# Patient Record
Sex: FEMALE | Race: WHITE | NOT HISPANIC OR LATINO
[De-identification: names, ages, dates, MRNs, and addresses within clinical notes are randomized per-mention and may not be internally consistent; named-entity substitution may affect disease eponyms.]

---

## 2017-01-17 ENCOUNTER — APPOINTMENT (OUTPATIENT)
Dept: BARIATRICS | Facility: CLINIC | Age: 47
End: 2017-01-17

## 2017-01-17 VITALS
HEIGHT: 69 IN | SYSTOLIC BLOOD PRESSURE: 105 MMHG | HEART RATE: 65 BPM | BODY MASS INDEX: 19.62 KG/M2 | WEIGHT: 132.5 LBS | DIASTOLIC BLOOD PRESSURE: 61 MMHG

## 2017-01-17 DIAGNOSIS — Z90.49 ACQUIRED ABSENCE OF OTHER SPECIFIED PARTS OF DIGESTIVE TRACT: ICD-10-CM

## 2017-02-07 ENCOUNTER — APPOINTMENT (OUTPATIENT)
Dept: GASTROENTEROLOGY | Facility: CLINIC | Age: 47
End: 2017-02-07

## 2017-02-07 VITALS
HEART RATE: 82 BPM | BODY MASS INDEX: 19.26 KG/M2 | HEIGHT: 69 IN | DIASTOLIC BLOOD PRESSURE: 69 MMHG | OXYGEN SATURATION: 99 % | WEIGHT: 130 LBS | SYSTOLIC BLOOD PRESSURE: 103 MMHG

## 2017-02-07 DIAGNOSIS — R14.0 ABDOMINAL DISTENSION (GASEOUS): ICD-10-CM

## 2017-03-26 ENCOUNTER — RESULT REVIEW (OUTPATIENT)
Age: 47
End: 2017-03-26

## 2017-04-03 ENCOUNTER — RESULT REVIEW (OUTPATIENT)
Age: 47
End: 2017-04-03

## 2017-12-07 ENCOUNTER — APPOINTMENT (OUTPATIENT)
Dept: RADIOLOGY | Facility: CLINIC | Age: 47
End: 2017-12-07

## 2017-12-07 ENCOUNTER — OUTPATIENT (OUTPATIENT)
Dept: OUTPATIENT SERVICES | Facility: HOSPITAL | Age: 47
LOS: 1 days | End: 2017-12-07
Payer: COMMERCIAL

## 2017-12-07 DIAGNOSIS — Z98.890 OTHER SPECIFIED POSTPROCEDURAL STATES: Chronic | ICD-10-CM

## 2017-12-07 DIAGNOSIS — Z00.8 ENCOUNTER FOR OTHER GENERAL EXAMINATION: ICD-10-CM

## 2017-12-07 PROCEDURE — 71046 X-RAY EXAM CHEST 2 VIEWS: CPT

## 2017-12-07 PROCEDURE — 71020: CPT | Mod: 26

## 2018-01-24 ENCOUNTER — TRANSCRIPTION ENCOUNTER (OUTPATIENT)
Age: 48
End: 2018-01-24

## 2018-03-03 ENCOUNTER — TRANSCRIPTION ENCOUNTER (OUTPATIENT)
Age: 48
End: 2018-03-03

## 2018-03-06 ENCOUNTER — APPOINTMENT (OUTPATIENT)
Dept: GASTROENTEROLOGY | Facility: CLINIC | Age: 48
End: 2018-03-06
Payer: COMMERCIAL

## 2018-03-06 VITALS
WEIGHT: 133 LBS | DIASTOLIC BLOOD PRESSURE: 73 MMHG | HEART RATE: 76 BPM | BODY MASS INDEX: 19.7 KG/M2 | OXYGEN SATURATION: 99 % | HEIGHT: 69 IN | SYSTOLIC BLOOD PRESSURE: 115 MMHG

## 2018-03-06 DIAGNOSIS — R10.13 EPIGASTRIC PAIN: ICD-10-CM

## 2018-03-06 DIAGNOSIS — R11.2 NAUSEA WITH VOMITING, UNSPECIFIED: ICD-10-CM

## 2018-03-06 LAB
ALBUMIN SERPL ELPH-MCNC: 4.1 G/DL
ALP BLD-CCNC: 61 U/L
ALT SERPL-CCNC: 12 U/L
AMYLASE/CREAT SERPL: 100 U/L
ANION GAP SERPL CALC-SCNC: 11 MMOL/L
AST SERPL-CCNC: 18 U/L
BASOPHILS # BLD AUTO: 0.01 K/UL
BASOPHILS NFR BLD AUTO: 0.2 %
BILIRUB SERPL-MCNC: 0.2 MG/DL
BUN SERPL-MCNC: 14 MG/DL
CALCIUM SERPL-MCNC: 9.3 MG/DL
CHLORIDE SERPL-SCNC: 101 MMOL/L
CO2 SERPL-SCNC: 27 MMOL/L
CREAT SERPL-MCNC: 0.93 MG/DL
EOSINOPHIL # BLD AUTO: 0.1 K/UL
EOSINOPHIL NFR BLD AUTO: 1.6 %
GLUCOSE SERPL-MCNC: 91 MG/DL
HCT VFR BLD CALC: 40.2 %
HGB BLD-MCNC: 13.4 G/DL
IGA SER QL IEP: 195 MG/DL
IMM GRANULOCYTES NFR BLD AUTO: 0.2 %
LPL SERPL-CCNC: 143 U/L
LYMPHOCYTES # BLD AUTO: 2 K/UL
LYMPHOCYTES NFR BLD AUTO: 31.6 %
MAN DIFF?: NORMAL
MCHC RBC-ENTMCNC: 31.2 PG
MCHC RBC-ENTMCNC: 33.3 GM/DL
MCV RBC AUTO: 93.7 FL
MONOCYTES # BLD AUTO: 0.44 K/UL
MONOCYTES NFR BLD AUTO: 7 %
NEUTROPHILS # BLD AUTO: 3.76 K/UL
NEUTROPHILS NFR BLD AUTO: 59.4 %
PLATELET # BLD AUTO: 236 K/UL
POTASSIUM SERPL-SCNC: 4.4 MMOL/L
PROT SERPL-MCNC: 6.6 G/DL
RBC # BLD: 4.29 M/UL
RBC # FLD: 12.9 %
SODIUM SERPL-SCNC: 139 MMOL/L
WBC # FLD AUTO: 6.32 K/UL

## 2018-03-06 PROCEDURE — 99214 OFFICE O/P EST MOD 30 MIN: CPT

## 2018-03-07 LAB
TTG IGA SER IA-ACNC: 5.1 UNITS
TTG IGA SER-ACNC: NEGATIVE
TTG IGG SER IA-ACNC: <5 UNITS
TTG IGG SER IA-ACNC: NEGATIVE

## 2018-03-08 LAB
ENDOMYSIUM IGA SER QL: NEGATIVE
ENDOMYSIUM IGA TITR SER: NORMAL
GLIADIN IGA SER QL: <5 UNITS
GLIADIN IGG SER QL: <5 UNITS
GLIADIN PEPTIDE IGA SER-ACNC: NEGATIVE
GLIADIN PEPTIDE IGG SER-ACNC: NEGATIVE

## 2018-03-09 ENCOUNTER — FORM ENCOUNTER (OUTPATIENT)
Age: 48
End: 2018-03-09

## 2018-03-10 ENCOUNTER — OUTPATIENT (OUTPATIENT)
Dept: OUTPATIENT SERVICES | Facility: HOSPITAL | Age: 48
LOS: 1 days | End: 2018-03-10
Payer: COMMERCIAL

## 2018-03-10 ENCOUNTER — APPOINTMENT (OUTPATIENT)
Dept: ULTRASOUND IMAGING | Facility: CLINIC | Age: 48
End: 2018-03-10

## 2018-03-10 DIAGNOSIS — R10.13 EPIGASTRIC PAIN: ICD-10-CM

## 2018-03-10 DIAGNOSIS — Z98.890 OTHER SPECIFIED POSTPROCEDURAL STATES: Chronic | ICD-10-CM

## 2018-03-10 PROCEDURE — 76700 US EXAM ABDOM COMPLETE: CPT

## 2018-03-10 PROCEDURE — 76700 US EXAM ABDOM COMPLETE: CPT | Mod: 26

## 2018-04-29 ENCOUNTER — RESULT REVIEW (OUTPATIENT)
Age: 48
End: 2018-04-29

## 2018-08-02 ENCOUNTER — TRANSCRIPTION ENCOUNTER (OUTPATIENT)
Age: 48
End: 2018-08-02

## 2018-10-04 ENCOUNTER — OUTPATIENT (OUTPATIENT)
Dept: OUTPATIENT SERVICES | Facility: HOSPITAL | Age: 48
LOS: 1 days | End: 2018-10-04
Payer: COMMERCIAL

## 2018-10-04 ENCOUNTER — APPOINTMENT (OUTPATIENT)
Dept: MRI IMAGING | Facility: CLINIC | Age: 48
End: 2018-10-04
Payer: COMMERCIAL

## 2018-10-04 DIAGNOSIS — Z00.8 ENCOUNTER FOR OTHER GENERAL EXAMINATION: ICD-10-CM

## 2018-10-04 DIAGNOSIS — Z98.890 OTHER SPECIFIED POSTPROCEDURAL STATES: Chronic | ICD-10-CM

## 2018-10-04 PROCEDURE — 73221 MRI JOINT UPR EXTREM W/O DYE: CPT

## 2018-10-04 PROCEDURE — 73221 MRI JOINT UPR EXTREM W/O DYE: CPT | Mod: 26,LT

## 2018-10-30 ENCOUNTER — OUTPATIENT (OUTPATIENT)
Dept: OUTPATIENT SERVICES | Facility: HOSPITAL | Age: 48
LOS: 1 days | End: 2018-10-30
Payer: COMMERCIAL

## 2018-10-30 VITALS
RESPIRATION RATE: 16 BRPM | OXYGEN SATURATION: 98 % | WEIGHT: 139.99 LBS | SYSTOLIC BLOOD PRESSURE: 121 MMHG | DIASTOLIC BLOOD PRESSURE: 67 MMHG | HEART RATE: 74 BPM | HEIGHT: 69.5 IN | TEMPERATURE: 98 F

## 2018-10-30 DIAGNOSIS — Z90.49 ACQUIRED ABSENCE OF OTHER SPECIFIED PARTS OF DIGESTIVE TRACT: Chronic | ICD-10-CM

## 2018-10-30 DIAGNOSIS — Z98.890 OTHER SPECIFIED POSTPROCEDURAL STATES: Chronic | ICD-10-CM

## 2018-10-30 DIAGNOSIS — M25.312 OTHER INSTABILITY, LEFT SHOULDER: ICD-10-CM

## 2018-10-30 DIAGNOSIS — Z01.818 ENCOUNTER FOR OTHER PREPROCEDURAL EXAMINATION: ICD-10-CM

## 2018-10-30 LAB
ANION GAP SERPL CALC-SCNC: 11 MMOL/L — SIGNIFICANT CHANGE UP (ref 5–17)
BUN SERPL-MCNC: 13 MG/DL — SIGNIFICANT CHANGE UP (ref 7–23)
CALCIUM SERPL-MCNC: 9.7 MG/DL — SIGNIFICANT CHANGE UP (ref 8.4–10.5)
CHLORIDE SERPL-SCNC: 104 MMOL/L — SIGNIFICANT CHANGE UP (ref 96–108)
CO2 SERPL-SCNC: 24 MMOL/L — SIGNIFICANT CHANGE UP (ref 22–31)
CREAT SERPL-MCNC: 0.93 MG/DL — SIGNIFICANT CHANGE UP (ref 0.5–1.3)
GLUCOSE SERPL-MCNC: 87 MG/DL — SIGNIFICANT CHANGE UP (ref 70–99)
HCT VFR BLD CALC: 42.3 % — SIGNIFICANT CHANGE UP (ref 34.5–45)
HGB BLD-MCNC: 14.7 G/DL — SIGNIFICANT CHANGE UP (ref 11.5–15.5)
MCHC RBC-ENTMCNC: 31.6 PG — SIGNIFICANT CHANGE UP (ref 27–34)
MCHC RBC-ENTMCNC: 34.8 GM/DL — SIGNIFICANT CHANGE UP (ref 32–36)
MCV RBC AUTO: 91 FL — SIGNIFICANT CHANGE UP (ref 80–100)
PLATELET # BLD AUTO: 239 K/UL — SIGNIFICANT CHANGE UP (ref 150–400)
POTASSIUM SERPL-MCNC: 3.4 MMOL/L — LOW (ref 3.5–5.3)
POTASSIUM SERPL-SCNC: 3.4 MMOL/L — LOW (ref 3.5–5.3)
RBC # BLD: 4.65 M/UL — SIGNIFICANT CHANGE UP (ref 3.8–5.2)
RBC # FLD: 12.5 % — SIGNIFICANT CHANGE UP (ref 10.3–14.5)
SODIUM SERPL-SCNC: 139 MMOL/L — SIGNIFICANT CHANGE UP (ref 135–145)
WBC # BLD: 6.67 K/UL — SIGNIFICANT CHANGE UP (ref 3.8–10.5)
WBC # FLD AUTO: 6.67 K/UL — SIGNIFICANT CHANGE UP (ref 3.8–10.5)

## 2018-10-30 PROCEDURE — 85027 COMPLETE CBC AUTOMATED: CPT

## 2018-10-30 PROCEDURE — G0463: CPT

## 2018-10-30 PROCEDURE — 80048 BASIC METABOLIC PNL TOTAL CA: CPT

## 2018-10-30 RX ORDER — LIDOCAINE HCL 20 MG/ML
0.2 VIAL (ML) INJECTION ONCE
Qty: 0 | Refills: 0 | Status: DISCONTINUED | OUTPATIENT
Start: 2018-11-13 | End: 2018-11-28

## 2018-10-30 RX ORDER — SODIUM CHLORIDE 9 MG/ML
3 INJECTION INTRAMUSCULAR; INTRAVENOUS; SUBCUTANEOUS EVERY 8 HOURS
Qty: 0 | Refills: 0 | Status: DISCONTINUED | OUTPATIENT
Start: 2018-11-13 | End: 2018-11-28

## 2018-10-30 NOTE — H&P PST ADULT - PMH
Celiac disease    Cholecystitis    Instability of left shoulder joint    Labral tear of shoulder  left  Lumbar disc herniation  L5-S1, asymptomatic  Migraines

## 2018-10-30 NOTE — H&P PST ADULT - NSANTHOSAYNRD_GEN_A_CORE
No. ROBINA screening performed.  STOP BANG Legend: 0-2 = LOW Risk; 3-4 = INTERMEDIATE Risk; 5-8 = HIGH Risk

## 2018-10-30 NOTE — H&P PST ADULT - PROBLEM SELECTOR PLAN 1
Pt is scheduled for left shoulder arthroscopy, anterior labrum repair and possible replissage procedure

## 2018-10-30 NOTE — H&P PST ADULT - FAMILY HISTORY
Mother  Still living? Yes, Estimated age: 71-80  Family history of colitis, Age at diagnosis: Age Unknown     Sibling  Still living? Yes, Estimated age: 41-50  Family history of digestive disorder, Age at diagnosis: Age Unknown

## 2018-10-30 NOTE — H&P PST ADULT - NS NEC GEN PE MLT EXAM PC
Patient called and updated with recommendations. Patient asked why when he looks up, his arm goes numb. Dr. Mckenna consulted and said to put patient on schedule to see him in 2 weeks. I will not place the order for neurology at this time.    Writer returned call to patient and informed him Dr Mckenna's suggestions and made apt for 2 weeks.    Ana Paula Cage RN on 9/6/2018 at 11:25 AM     No bruits; no thyromegaly or nodules

## 2018-10-30 NOTE — H&P PST ADULT - HISTORY OF PRESENT ILLNESS
Pt is a 48y.o. WF who presents with long h/o left shoulder complaints and recurrent dislocations. She is scheduled for a left shoulder arthroscopy, anterior labrum repair and possible remplissage procedure with Dr. Ritchie on 11/13/18.

## 2018-11-12 ENCOUNTER — TRANSCRIPTION ENCOUNTER (OUTPATIENT)
Age: 48
End: 2018-11-12

## 2018-11-13 ENCOUNTER — OUTPATIENT (OUTPATIENT)
Dept: OUTPATIENT SERVICES | Facility: HOSPITAL | Age: 48
LOS: 1 days | End: 2018-11-13
Payer: COMMERCIAL

## 2018-11-13 VITALS
OXYGEN SATURATION: 100 % | SYSTOLIC BLOOD PRESSURE: 120 MMHG | RESPIRATION RATE: 20 BRPM | TEMPERATURE: 98 F | DIASTOLIC BLOOD PRESSURE: 84 MMHG | WEIGHT: 139.99 LBS | HEIGHT: 69.5 IN | HEART RATE: 76 BPM

## 2018-11-13 VITALS — RESPIRATION RATE: 18 BRPM | OXYGEN SATURATION: 99 % | HEART RATE: 88 BPM

## 2018-11-13 DIAGNOSIS — M25.312 OTHER INSTABILITY, LEFT SHOULDER: ICD-10-CM

## 2018-11-13 DIAGNOSIS — Z90.49 ACQUIRED ABSENCE OF OTHER SPECIFIED PARTS OF DIGESTIVE TRACT: Chronic | ICD-10-CM

## 2018-11-13 DIAGNOSIS — Z98.890 OTHER SPECIFIED POSTPROCEDURAL STATES: Chronic | ICD-10-CM

## 2018-11-13 PROCEDURE — 29807 SHO ARTHRS SRG RPR SLAP LES: CPT | Mod: LT

## 2018-11-13 PROCEDURE — 29827 SHO ARTHRS SRG RT8TR CUF RPR: CPT | Mod: LT

## 2018-11-13 PROCEDURE — C1713: CPT

## 2018-11-13 RX ORDER — CELECOXIB 200 MG/1
200 CAPSULE ORAL ONCE
Qty: 0 | Refills: 0 | Status: COMPLETED | OUTPATIENT
Start: 2018-11-13 | End: 2018-11-13

## 2018-11-13 RX ORDER — ONDANSETRON 8 MG/1
4 TABLET, FILM COATED ORAL ONCE
Qty: 0 | Refills: 0 | Status: DISCONTINUED | OUTPATIENT
Start: 2018-11-13 | End: 2018-11-28

## 2018-11-13 RX ORDER — ACETAMINOPHEN 500 MG
1000 TABLET ORAL ONCE
Qty: 0 | Refills: 0 | Status: COMPLETED | OUTPATIENT
Start: 2018-11-13 | End: 2018-11-13

## 2018-11-13 RX ORDER — SODIUM CHLORIDE 9 MG/ML
1000 INJECTION, SOLUTION INTRAVENOUS
Qty: 0 | Refills: 0 | Status: DISCONTINUED | OUTPATIENT
Start: 2018-11-13 | End: 2018-11-28

## 2018-11-13 RX ORDER — CELECOXIB 200 MG/1
200 CAPSULE ORAL ONCE
Qty: 0 | Refills: 0 | Status: DISCONTINUED | OUTPATIENT
Start: 2018-11-13 | End: 2018-11-28

## 2018-11-13 RX ADMIN — CELECOXIB 200 MILLIGRAM(S): 200 CAPSULE ORAL at 10:59

## 2018-11-13 RX ADMIN — Medication 1000 MILLIGRAM(S): at 10:59

## 2018-11-13 NOTE — ASU DISCHARGE PLAN (ADULT/PEDIATRIC). - NOTIFY
Pain not relieved by Medications/Inability to Tolerate Liquids or Foods/Bleeding that does not stop/Unable to Urinate/Fever greater than 101

## 2018-11-13 NOTE — ASU DISCHARGE PLAN (ADULT/PEDIATRIC). - MEDICATION SUMMARY - MEDICATIONS TO TAKE
I will START or STAY ON the medications listed below when I get home from the hospital:    Unique 3 mg-0.02 mg oral tablet  -- 1 tab(s) by mouth once a day  -- Indication: For Home med

## 2019-01-27 ENCOUNTER — TRANSCRIPTION ENCOUNTER (OUTPATIENT)
Age: 49
End: 2019-01-27

## 2019-06-30 ENCOUNTER — TRANSCRIPTION ENCOUNTER (OUTPATIENT)
Age: 49
End: 2019-06-30

## 2020-03-03 ENCOUNTER — TRANSCRIPTION ENCOUNTER (OUTPATIENT)
Age: 50
End: 2020-03-03

## 2020-04-25 ENCOUNTER — MESSAGE (OUTPATIENT)
Age: 50
End: 2020-04-25

## 2020-05-03 PROBLEM — G43.909 MIGRAINE, UNSPECIFIED, NOT INTRACTABLE, WITHOUT STATUS MIGRAINOSUS: Chronic | Status: ACTIVE | Noted: 2018-10-30

## 2020-05-03 PROBLEM — M25.312 OTHER INSTABILITY, LEFT SHOULDER: Chronic | Status: ACTIVE | Noted: 2018-10-30

## 2020-05-06 ENCOUNTER — APPOINTMENT (OUTPATIENT)
Dept: DISASTER EMERGENCY | Facility: HOSPITAL | Age: 50
End: 2020-05-06

## 2020-05-07 LAB
SARS-COV-2 IGG SERPL IA-ACNC: 0 INDEX
SARS-COV-2 IGG SERPL QL IA: NEGATIVE

## 2020-08-17 ENCOUNTER — TRANSCRIPTION ENCOUNTER (OUTPATIENT)
Age: 50
End: 2020-08-17

## 2020-10-29 ENCOUNTER — TRANSCRIPTION ENCOUNTER (OUTPATIENT)
Age: 50
End: 2020-10-29

## 2020-11-05 ENCOUNTER — TRANSCRIPTION ENCOUNTER (OUTPATIENT)
Age: 50
End: 2020-11-05

## 2021-04-10 ENCOUNTER — TRANSCRIPTION ENCOUNTER (OUTPATIENT)
Age: 51
End: 2021-04-10

## 2021-04-15 ENCOUNTER — TRANSCRIPTION ENCOUNTER (OUTPATIENT)
Age: 51
End: 2021-04-15

## 2021-08-13 NOTE — ASU PREOP CHECKLIST - PATIENT'S PERSONAL PROPERTY GIVEN TO
Today you were seen in the ER for evlauation of your seizure. We spoke to Dr. Jackman your neurologist. You need to take 250mg of lamotrigine twice daily. We have written you a new prescription for this.     She wants you to call the office first thing tomorrow and tell them that you had a seizure and were seen in the ER and you need an appointment.     Call your healthcare provider right away if any of these occur:  Seizures happen more often or last longer than usual  A seizure lasts over 5 minutes  You don’t wake up between seizures  Confusion that lasts more than 30 minutes after a seizure  Injury during a seizure  Fever over 100.4ºF (38.0ºC), or as advised  Unusual irritability, drowsiness, or confusion  Stiff or painful neck  Headache that gets worse     
security/safe/on unit

## 2021-10-20 NOTE — H&P PST ADULT - NSCAFFEAMTFREQ_GEN_ALL_CORE_SD
Left knee immobilizer and left post op shoe applied.  Attempted to get patient up to commode.  Patient was unable to stand.    3-4 cups/cans per day

## 2022-01-06 ENCOUNTER — APPOINTMENT (OUTPATIENT)
Dept: MRI IMAGING | Facility: CLINIC | Age: 52
End: 2022-01-06
Payer: COMMERCIAL

## 2022-01-06 PROCEDURE — 70336 MAGNETIC IMAGE JAW JOINT: CPT

## 2023-01-12 ENCOUNTER — NON-APPOINTMENT (OUTPATIENT)
Age: 53
End: 2023-01-12

## 2023-03-14 ENCOUNTER — APPOINTMENT (OUTPATIENT)
Dept: GASTROENTEROLOGY | Facility: CLINIC | Age: 53
End: 2023-03-14
Payer: COMMERCIAL

## 2023-03-14 VITALS
HEART RATE: 62 BPM | WEIGHT: 145 LBS | DIASTOLIC BLOOD PRESSURE: 68 MMHG | OXYGEN SATURATION: 98 % | SYSTOLIC BLOOD PRESSURE: 109 MMHG | BODY MASS INDEX: 21.48 KG/M2 | HEIGHT: 69 IN

## 2023-03-14 DIAGNOSIS — Z12.11 ENCOUNTER FOR SCREENING FOR MALIGNANT NEOPLASM OF COLON: ICD-10-CM

## 2023-03-14 DIAGNOSIS — Z83.79 FAMILY HISTORY OF OTHER DISEASES OF THE DIGESTIVE SYSTEM: ICD-10-CM

## 2023-03-14 DIAGNOSIS — Z12.12 ENCOUNTER FOR SCREENING FOR MALIGNANT NEOPLASM OF COLON: ICD-10-CM

## 2023-03-14 PROCEDURE — 99204 OFFICE O/P NEW MOD 45 MIN: CPT

## 2023-03-14 RX ORDER — OMEPRAZOLE 40 MG/1
40 CAPSULE, DELAYED RELEASE ORAL TWICE DAILY
Qty: 60 | Refills: 2 | Status: DISCONTINUED | COMMUNITY
Start: 2018-03-06 | End: 2023-03-14

## 2023-03-14 RX ORDER — SODIUM SULFATE, MAGNESIUM SULFATE, AND POTASSIUM CHLORIDE 17.75; 2.7; 2.25 G/1; G/1; G/1
1479-225-188 TABLET ORAL
Qty: 1 | Refills: 0 | Status: ACTIVE | COMMUNITY
Start: 2023-03-14 | End: 1900-01-01

## 2023-03-14 NOTE — ASSESSMENT
[FreeTextEntry1] : IMPRESSION:\par #  Due for a screening test for colon polyps/early stage colon cancer\par -  Never had a colonoscopy \par - Years of rectal bleeding mainly notice after wiping - years - strains most times for years. \par -  No Colon cancer in the FDR, or colon polyp in the FDR\par \par #  History of celiac disease\par -  She was diagnosed with celiac disease many years ago and has been on a strict gluten free diet. \par -  EGD 11/16 showed mild chronic active duodenitis. Gastric biopsies showed mild chronic gastritis negative for H. pylori bacteria. GE junction biopsies showed mild chronic gastric carditis.\par -  CT scan of the A/P with IV contrast on 10/2016:  Normal pancreas small left hepatic cyst. \par -  Lipase of 143 on 3/2018 \par \par #  Family history of IBD - son has Crohns, mom has lymphocytic colitis\par \par \par PLAN: \par I had a long discussion with the patient regarding colon cancer in the [place].    \par \par We reviewed risk factors for colon cancer which include age, having a family hx of colon cancer, cancer syndromes, personal hx of inflammatory disease, having comorbidities such as obesity, DM, cardiac disease, having nicotine addiction, alcohol addiction,etc.  \par \par Patient is average risk for colon cancer.  \par \par We reviewed the screening tests for colon cancer prevention.  We discussed screening tests such as stool test, CT scans such as CT colonography, and a colonoscopy.    Patient was made aware that despite these screening test, a cancerous lesion can still be missed.  The gold standard test is a screening colonoscopy. \par \par I have advised the patient to arrange for a colonoscopy to rule out colon polyps, colorectal cancer etc. under monitored anesthesia care.  Risks such as perforation requiring surgery, colostomy, bleeding requiring blood transfusion, infection/sepsis, diverticulitis, colitis, missed colon cancer (2% to 6%), internal organ injury such as splenic hemorrhage etc, adverse reaction to medication etc. and risks of anesthesia including cardiopulmonary compromise requiring ICU care were discussed with patient.  Alternatives were discussed.  Patient verbalized understanding and agrees to proceed with a colonoscopy under anesthesia.\par \par MiraLAX once a day or twice a day leading up to colonoscopy. \par \par \par

## 2023-03-14 NOTE — HISTORY OF PRESENT ILLNESS
[FreeTextEntry1] : 54 y/o mother of two with hx of celiac disease who presents for a screening colonoscopy. \par \par Never had a colonoscopy. Once in while has rectal bleeding - strains to have BM - years.  Patient denies having abdominal pain, loss of appetite, weight loss, melena.\par \par Patient denies having heartburn, regurgitation, difficulty swallowing, painful swallowing, nausea, vomiting.\par \par Follows a healthy diet - follows a gluten free diet. \par \par Maternal aunt diagnosed in her 80s with GB cancer. Patient denies family history of GI cancers.\par \par All other review of systems are negative.  Denies cardiac symptoms.\par \par \par Initial office visit 10/2016: \par Pt reports pain is pressure like, associated with feeling bloated, gassy with nausea. She says she has lost her appetite, and denies wt loss. \par \par At the height of her pain she presented to urgent care and blood work was done that showed elevated lipase, she was recommended to go to [facility] which she did on 10/22/16. She was kept in ER and underwent repeat blood test, and and ultrasound. Labs revealed normal CBC, normal CMP, with lipase of 465, with normal ultrasound of the liver, gallbladder and hepatobiliary tree. She was seen by a surgeon and was told she probably has gallbladder dyskinesia and would need out patient HIDA scan. \par \par She was diagnosed with celiac disease many years ago and has been on a strict gluten free diet. \par Elevation in adolfo/lip likely not pancreatic with negative MRI -IGg subclasses were negative. Lipase elevation may be secondary to celiac sprue.\par \par EGD on 11/16 showed mild chronic active duodenitis. Gastric biopsies showed mild chronic gastritis negative for H. pylori bacteria. GE junction biopsies showed mild chronic gastric carditis.\par \par Pt underwent lap cholecystectomy 12/7/16 \par \par \par Notes from Feb 7, 2017: \par Patient reports since her gallbladder surgery she has felt much better. She says at times when she eats a large meal for dinner, she feels more bloated and gassy. She has recently been more on a vegetarian diet since her gallbladder surgery. When she reduces large meals she feels much better. She denies any new symptoms. \par \par \par Office visit 3/2018: \par Patient reports she was feeling well up until 2 weeks ago. 2 weeks ago she started feeling nauseous without any vomiting. This past Friday night she woke up with horrible pains in her chest after which she vomited several times from 2 AM to 5 AM. She went to urgent care the next day and was prescribed omeprazole along with Zofran. She couldn't eat - she only had roll of bread that day. On Sunday morning she started having diarrhea x2. She says her symptoms have been gradually improving but she still is nauseous and does "not feel right". She says the epigastric pain feels like a pressure/soreness. She is overall feeling achy and weak. She went to work yesterday and last night regained her appetite back. She denies sick contacts. Reports chills, but no fevers. Denies URI symptoms. \par \par \par Discussion/Summary 3/2018: \par Blood test results and ultrasound were reviewed with patient over the phone. She is feeling a lot better on PPI twice a day. \par \par

## 2023-06-07 ENCOUNTER — TRANSCRIPTION ENCOUNTER (OUTPATIENT)
Age: 53
End: 2023-06-07

## 2023-06-07 ENCOUNTER — OUTPATIENT (OUTPATIENT)
Dept: OUTPATIENT SERVICES | Facility: HOSPITAL | Age: 53
LOS: 1 days | End: 2023-06-07
Payer: COMMERCIAL

## 2023-06-07 VITALS
SYSTOLIC BLOOD PRESSURE: 115 MMHG | DIASTOLIC BLOOD PRESSURE: 61 MMHG | OXYGEN SATURATION: 97 % | HEIGHT: 69 IN | HEART RATE: 59 BPM | WEIGHT: 139.99 LBS | TEMPERATURE: 98 F | RESPIRATION RATE: 16 BRPM

## 2023-06-07 VITALS
OXYGEN SATURATION: 97 % | DIASTOLIC BLOOD PRESSURE: 67 MMHG | RESPIRATION RATE: 16 BRPM | HEART RATE: 56 BPM | SYSTOLIC BLOOD PRESSURE: 114 MMHG

## 2023-06-07 DIAGNOSIS — I31.9 DISEASE OF PERICARDIUM, UNSPECIFIED: ICD-10-CM

## 2023-06-07 DIAGNOSIS — Z90.49 ACQUIRED ABSENCE OF OTHER SPECIFIED PARTS OF DIGESTIVE TRACT: Chronic | ICD-10-CM

## 2023-06-07 DIAGNOSIS — Z98.890 OTHER SPECIFIED POSTPROCEDURAL STATES: Chronic | ICD-10-CM

## 2023-06-07 LAB
ANION GAP SERPL CALC-SCNC: 10 MMOL/L — SIGNIFICANT CHANGE UP (ref 5–17)
ANION GAP SERPL CALC-SCNC: 14 MMOL/L — SIGNIFICANT CHANGE UP (ref 5–17)
BUN SERPL-MCNC: 10 MG/DL — SIGNIFICANT CHANGE UP (ref 7–23)
BUN SERPL-MCNC: 8 MG/DL — SIGNIFICANT CHANGE UP (ref 7–23)
CALCIUM SERPL-MCNC: 6.5 MG/DL — CRITICAL LOW (ref 8.4–10.5)
CALCIUM SERPL-MCNC: 9.4 MG/DL — SIGNIFICANT CHANGE UP (ref 8.4–10.5)
CHLORIDE SERPL-SCNC: 105 MMOL/L — SIGNIFICANT CHANGE UP (ref 96–108)
CHLORIDE SERPL-SCNC: 115 MMOL/L — HIGH (ref 96–108)
CO2 SERPL-SCNC: 19 MMOL/L — LOW (ref 22–31)
CO2 SERPL-SCNC: 22 MMOL/L — SIGNIFICANT CHANGE UP (ref 22–31)
CREAT SERPL-MCNC: 0.59 MG/DL — SIGNIFICANT CHANGE UP (ref 0.5–1.3)
CREAT SERPL-MCNC: 0.83 MG/DL — SIGNIFICANT CHANGE UP (ref 0.5–1.3)
EGFR: 107 ML/MIN/1.73M2 — SIGNIFICANT CHANGE UP
EGFR: 84 ML/MIN/1.73M2 — SIGNIFICANT CHANGE UP
GLUCOSE SERPL-MCNC: 67 MG/DL — LOW (ref 70–99)
GLUCOSE SERPL-MCNC: 88 MG/DL — SIGNIFICANT CHANGE UP (ref 70–99)
HCT VFR BLD CALC: 41.8 % — SIGNIFICANT CHANGE UP (ref 34.5–45)
HGB BLD-MCNC: 14.2 G/DL — SIGNIFICANT CHANGE UP (ref 11.5–15.5)
HGB FLD-MCNC: 13.3 G/DL — SIGNIFICANT CHANGE UP (ref 11.7–16.5)
MCHC RBC-ENTMCNC: 30.9 PG — SIGNIFICANT CHANGE UP (ref 27–34)
MCHC RBC-ENTMCNC: 34 GM/DL — SIGNIFICANT CHANGE UP (ref 32–36)
MCV RBC AUTO: 91.1 FL — SIGNIFICANT CHANGE UP (ref 80–100)
NRBC # BLD: 0 /100 WBCS — SIGNIFICANT CHANGE UP (ref 0–0)
OXYHGB MFR BLDMV: 65.9 % — LOW (ref 90–95)
PLATELET # BLD AUTO: 208 K/UL — SIGNIFICANT CHANGE UP (ref 150–400)
POTASSIUM SERPL-MCNC: 2.9 MMOL/L — CRITICAL LOW (ref 3.5–5.3)
POTASSIUM SERPL-MCNC: 3.9 MMOL/L — SIGNIFICANT CHANGE UP (ref 3.5–5.3)
POTASSIUM SERPL-SCNC: 2.9 MMOL/L — CRITICAL LOW (ref 3.5–5.3)
POTASSIUM SERPL-SCNC: 3.9 MMOL/L — SIGNIFICANT CHANGE UP (ref 3.5–5.3)
RBC # BLD: 4.59 M/UL — SIGNIFICANT CHANGE UP (ref 3.8–5.2)
RBC # FLD: 12.3 % — SIGNIFICANT CHANGE UP (ref 10.3–14.5)
SAO2 % BLD: 67.5 % — SIGNIFICANT CHANGE UP (ref 60–90)
SODIUM SERPL-SCNC: 141 MMOL/L — SIGNIFICANT CHANGE UP (ref 135–145)
SODIUM SERPL-SCNC: 144 MMOL/L — SIGNIFICANT CHANGE UP (ref 135–145)
WBC # BLD: 4.83 K/UL — SIGNIFICANT CHANGE UP (ref 3.8–10.5)
WBC # FLD AUTO: 4.83 K/UL — SIGNIFICANT CHANGE UP (ref 3.8–10.5)

## 2023-06-07 PROCEDURE — 82803 BLOOD GASES ANY COMBINATION: CPT

## 2023-06-07 PROCEDURE — 93456 R HRT CORONARY ARTERY ANGIO: CPT

## 2023-06-07 PROCEDURE — C1887: CPT

## 2023-06-07 PROCEDURE — 93456 R HRT CORONARY ARTERY ANGIO: CPT | Mod: 26

## 2023-06-07 PROCEDURE — 80048 BASIC METABOLIC PNL TOTAL CA: CPT

## 2023-06-07 PROCEDURE — C1889: CPT

## 2023-06-07 PROCEDURE — 93005 ELECTROCARDIOGRAM TRACING: CPT

## 2023-06-07 PROCEDURE — 93010 ELECTROCARDIOGRAM REPORT: CPT

## 2023-06-07 PROCEDURE — C1769: CPT

## 2023-06-07 PROCEDURE — 85027 COMPLETE CBC AUTOMATED: CPT

## 2023-06-07 PROCEDURE — C1894: CPT

## 2023-06-07 PROCEDURE — 36415 COLL VENOUS BLD VENIPUNCTURE: CPT

## 2023-06-07 RX ORDER — POTASSIUM CHLORIDE 20 MEQ
40 PACKET (EA) ORAL ONCE
Refills: 0 | Status: DISCONTINUED | OUTPATIENT
Start: 2023-06-07 | End: 2023-06-07

## 2023-06-07 RX ORDER — POTASSIUM CHLORIDE 20 MEQ
20 PACKET (EA) ORAL ONCE
Refills: 0 | Status: COMPLETED | OUTPATIENT
Start: 2023-06-07 | End: 2023-06-07

## 2023-06-07 RX ORDER — POTASSIUM BICARBONATE 978 MG/1
25 TABLET, EFFERVESCENT ORAL ONCE
Refills: 0 | Status: DISCONTINUED | OUTPATIENT
Start: 2023-06-07 | End: 2023-06-07

## 2023-06-07 RX ORDER — MAGNESIUM SULFATE 500 MG/ML
1 VIAL (ML) INJECTION ONCE
Refills: 0 | Status: DISCONTINUED | OUTPATIENT
Start: 2023-06-07 | End: 2023-06-07

## 2023-06-07 RX ORDER — ESCITALOPRAM OXALATE 10 MG/1
1 TABLET, FILM COATED ORAL
Refills: 0 | DISCHARGE

## 2023-06-07 RX ADMIN — Medication 50 MILLIEQUIVALENT(S): at 08:27

## 2023-06-07 NOTE — H&P CARDIOLOGY - NSICDXPASTMEDICALHX_GEN_ALL_CORE_FT
PAST MEDICAL HISTORY:  Celiac disease     Cholecystitis     Instability of left shoulder joint     Labral tear of shoulder left    Lumbar disc herniation L5-S1, asymptomatic    Migraines

## 2023-06-07 NOTE — ASU PATIENT PROFILE, ADULT - FALL HARM RISK - UNIVERSAL INTERVENTIONS
Bed in lowest position, wheels locked, appropriate side rails in place/Call bell, personal items and telephone in reach/Instruct patient to call for assistance before getting out of bed or chair/Non-slip footwear when patient is out of bed/[place] to call system/Physically safe environment - no spills, clutter or unnecessary equipment/Purposeful Proactive Rounding/Room/bathroom lighting operational, light cord in reach

## 2023-06-07 NOTE — ASU DISCHARGE PLAN (ADULT/PEDIATRIC) - NS MD DC FALL RISK RISK
For information on Fall & Injury Prevention, visit: [email or web address] OR  [email or web address] OR  https://www.cdc.gov/steadi/patient.html

## 2023-06-07 NOTE — H&P CARDIOLOGY - HISTORY OF PRESENT ILLNESS
53 yr old Female with PMHx of Celiac disease presents with dizziness for past couple of months. Pt underwent TTE which showed Constrictive Pericarditis. Pt was referred for R/LHC by Dr Vogt.

## 2023-06-07 NOTE — H&P CARDIOLOGY - NSICDXPASTSURGICALHX_GEN_ALL_CORE_FT
PAST SURGICAL HISTORY:  S/P arthroscopy of shoulder left 1998    S/P cholecystectomy     S/P endoscopy 2 weeks ago

## 2023-06-07 NOTE — ASU DISCHARGE PLAN (ADULT/PEDIATRIC) - CARE PROVIDER_API CALL
Darío Vogt  Cardiovascular Disease  [address], [address] [place] [address]  Phone: [phone]  Fax: [phone]  Follow Up Time:

## 2023-06-16 LAB
HGB BLDA-MCNC: 13.4 G/DL — SIGNIFICANT CHANGE UP (ref 11.7–16.1)
HGB FLD-MCNC: 13.1 G/DL — SIGNIFICANT CHANGE UP (ref 11.7–16.5)
OXYHGB MFR BLDA: 97.7 % — HIGH (ref 90–95)
OXYHGB MFR BLDMV: 68.1 % — LOW (ref 90–95)
SAO2 % BLD: 69.5 % — SIGNIFICANT CHANGE UP (ref 60–90)
SAO2 % BLDA: 99.9 % — HIGH (ref 94–98)

## 2023-07-07 ENCOUNTER — OUTPATIENT (OUTPATIENT)
Dept: OUTPATIENT SERVICES | Facility: HOSPITAL | Age: 53
LOS: 1 days | End: 2023-07-07
Payer: COMMERCIAL

## 2023-07-07 ENCOUNTER — APPOINTMENT (OUTPATIENT)
Dept: MRI IMAGING | Facility: HOSPITAL | Age: 53
End: 2023-07-07

## 2023-07-07 DIAGNOSIS — I31.1 CHRONIC CONSTRICTIVE PERICARDITIS: ICD-10-CM

## 2023-07-07 DIAGNOSIS — Z98.890 OTHER SPECIFIED POSTPROCEDURAL STATES: Chronic | ICD-10-CM

## 2023-07-07 DIAGNOSIS — Z90.49 ACQUIRED ABSENCE OF OTHER SPECIFIED PARTS OF DIGESTIVE TRACT: Chronic | ICD-10-CM

## 2023-07-07 PROCEDURE — A9585: CPT

## 2023-07-07 PROCEDURE — 75561 CARDIAC MRI FOR MORPH W/DYE: CPT

## 2023-07-07 PROCEDURE — 75561 CARDIAC MRI FOR MORPH W/DYE: CPT | Mod: 26

## 2023-07-13 ENCOUNTER — NON-APPOINTMENT (OUTPATIENT)
Age: 53
End: 2023-07-13

## 2023-07-13 ENCOUNTER — APPOINTMENT (OUTPATIENT)
Dept: CARDIOLOGY | Facility: CLINIC | Age: 53
End: 2023-07-13
Payer: COMMERCIAL

## 2023-07-13 VITALS
HEART RATE: 56 BPM | BODY MASS INDEX: 21.48 KG/M2 | DIASTOLIC BLOOD PRESSURE: 68 MMHG | OXYGEN SATURATION: 100 % | HEIGHT: 69 IN | WEIGHT: 145 LBS | SYSTOLIC BLOOD PRESSURE: 117 MMHG | TEMPERATURE: 97.5 F

## 2023-07-13 DIAGNOSIS — I31.1 CHRONIC CONSTRICTIVE PERICARDITIS: ICD-10-CM

## 2023-07-13 PROCEDURE — 99215 OFFICE O/P EST HI 40 MIN: CPT

## 2023-07-13 PROCEDURE — 93000 ELECTROCARDIOGRAM COMPLETE: CPT

## 2023-07-15 ENCOUNTER — NON-APPOINTMENT (OUTPATIENT)
Age: 53
End: 2023-07-15

## 2023-07-15 PROBLEM — I31.1 PERICARDITIS, CONSTRICTIVE: Status: ACTIVE | Noted: 2023-06-07

## 2023-07-15 NOTE — HISTORY OF PRESENT ILLNESS
pancytopenia positive urine culture [FreeTextEntry1] : Here to f/u after MRI\par Recent cath and prior Echo reviewed\par Feels well\par No complaints\par  Abnormal CT scan / Liver cysts / Abnormal liver chemistries

## 2023-07-15 NOTE — DISCUSSION/SUMMARY
[FreeTextEntry1] : Based on MRI and cath findings there is no current evidence of constriction.\par There may have been a pericardial effusion which is now estimated to be trace\par No Rx recc\par F/u with Dr. Vogt and Michael\par \par Time spent reviewing her old echo, cath and MRI [EKG obtained to assist in diagnosis and management of assessed problem(s)] : EKG obtained to assist in diagnosis and management of assessed problem(s)

## 2023-07-15 NOTE — CARDIOLOGY SUMMARY
[de-identified] : 7/13/23\par Sinus  Bradycardia \par -Poor R-wave progression -nonspecific -consider old anterior infarct. \par \par BORDERLINE\par

## 2023-07-15 NOTE — PHYSICAL EXAM
[Well Developed] : well developed [Well Nourished] : well nourished [No Acute Distress] : no acute distress [Normal Conjunctiva] : normal conjunctiva [Normal Venous Pressure] : normal venous pressure [No Carotid Bruit] : no carotid bruit [Normal S1, S2] : normal S1, S2 [No Murmur] : no murmur [No Rub] : no rub [No Gallop] : no gallop [Good Air Entry] : good air entry [Clear Lung Fields] : clear lung fields [No Respiratory Distress] : no respiratory distress  [Soft] : abdomen soft [Non Tender] : non-tender [No Masses/organomegaly] : no masses/organomegaly [Normal Bowel Sounds] : normal bowel sounds [Normal Gait] : normal gait [No Edema] : no edema [No Cyanosis] : no cyanosis [No Clubbing] : no clubbing [No Rash] : no rash [No Varicosities] : no varicosities [No Skin Lesions] : no skin lesions [Moves all extremities] : moves all extremities [No Focal Deficits] : no focal deficits [Normal Speech] : normal speech [Normal memory] : normal memory [Alert and Oriented] : alert and oriented

## 2023-08-21 ENCOUNTER — NON-APPOINTMENT (OUTPATIENT)
Age: 53
End: 2023-08-21

## 2023-12-11 NOTE — H&P CARDIOLOGY - NSICDXFAMILYHX_GEN_ALL_CORE_FT
FAMILY HISTORY:  Mother  Still living? Yes, Estimated age: 71-80  Family history of colitis, Age at diagnosis: Age Unknown    Sibling  Still living? Yes, Estimated age: 41-50  Family history of digestive disorder, Age at diagnosis: Age Unknown    
Adult

## 2024-03-26 NOTE — ASU PATIENT PROFILE, ADULT - NSSUBSTANCEUSE_GEN_ALL_CORE_SD
Patient mother Lori Seals, HIPAA, called and wanted to inform the provider that patient is still not feeling any better.  Stated that she is concerned about he patient at this time.  Stated that patient has not been sleeping more than two hours. Stated that the patient is wound tight, not able to relax, and is not eating.  Stated that he has been vomiting, she gave the patient one of her Zofran to help with the nausea and vomiting, she stated that she knows that she should not be sharing her medication, but needs to try something to help with N/V. Wanted to know if provider could give the patient this medication so he can attempt to eat.  Mother is concerned about patient mental health, stated that he does not want to do anything or go be with his friends, that he would rather lay around or hide in his bedroom.  Stated that he told her he just feels like a 69 year old man and has not desire to do anything.   Please advise.    caffeine

## 2024-09-02 ENCOUNTER — NON-APPOINTMENT (OUTPATIENT)
Age: 54
End: 2024-09-02

## 2024-09-26 ENCOUNTER — NON-APPOINTMENT (OUTPATIENT)
Age: 54
End: 2024-09-26

## 2024-11-25 ENCOUNTER — NON-APPOINTMENT (OUTPATIENT)
Age: 54
End: 2024-11-25

## 2024-11-25 ENCOUNTER — APPOINTMENT (OUTPATIENT)
Dept: DERMATOLOGY | Facility: CLINIC | Age: 54
End: 2024-11-25

## 2024-11-25 VITALS — WEIGHT: 135 LBS | BODY MASS INDEX: 19.99 KG/M2 | HEIGHT: 69 IN

## 2024-11-25 DIAGNOSIS — B00.9 HERPESVIRAL INFECTION, UNSPECIFIED: ICD-10-CM

## 2024-11-25 DIAGNOSIS — L57.0 ACTINIC KERATOSIS: ICD-10-CM

## 2024-11-25 DIAGNOSIS — Z12.83 ENCOUNTER FOR SCREENING FOR MALIGNANT NEOPLASM OF SKIN: ICD-10-CM

## 2024-11-25 DIAGNOSIS — D22.9 MELANOCYTIC NEVI, UNSPECIFIED: ICD-10-CM

## 2024-11-25 PROCEDURE — 17000 DESTRUCT PREMALG LESION: CPT

## 2024-11-25 PROCEDURE — 99204 OFFICE O/P NEW MOD 45 MIN: CPT | Mod: 25

## 2024-12-03 ENCOUNTER — APPOINTMENT (OUTPATIENT)
Dept: GASTROENTEROLOGY | Facility: CLINIC | Age: 54
End: 2024-12-03
Payer: COMMERCIAL

## 2024-12-03 VITALS
BODY MASS INDEX: 19.99 KG/M2 | HEIGHT: 69 IN | DIASTOLIC BLOOD PRESSURE: 62 MMHG | WEIGHT: 135 LBS | HEART RATE: 69 BPM | OXYGEN SATURATION: 96 % | SYSTOLIC BLOOD PRESSURE: 100 MMHG

## 2024-12-03 DIAGNOSIS — R10.30 LOWER ABDOMINAL PAIN, UNSPECIFIED: ICD-10-CM

## 2024-12-03 DIAGNOSIS — I31.1 CHRONIC CONSTRICTIVE PERICARDITIS: ICD-10-CM

## 2024-12-03 PROCEDURE — 99214 OFFICE O/P EST MOD 30 MIN: CPT

## 2024-12-03 RX ORDER — ESCITALOPRAM OXALATE 10 MG/1
10 TABLET, FILM COATED ORAL
Refills: 0 | Status: ACTIVE | COMMUNITY

## 2024-12-03 RX ORDER — SODIUM SULFATE, MAGNESIUM SULFATE, AND POTASSIUM CHLORIDE 17.75; 2.7; 2.25 G/1; G/1; G/1
1479-225-188 TABLET ORAL
Qty: 1 | Refills: 0 | Status: ACTIVE | COMMUNITY
Start: 2024-12-03 | End: 1900-01-01

## 2024-12-03 RX ORDER — VALACYCLOVIR 1 G/1
1 TABLET, FILM COATED ORAL
Qty: 12 | Refills: 4 | Status: DISCONTINUED | COMMUNITY
Start: 2024-11-25 | End: 2024-12-03

## 2024-12-05 DIAGNOSIS — Z12.12 ENCOUNTER FOR SCREENING FOR MALIGNANT NEOPLASM OF COLON: ICD-10-CM

## 2024-12-05 DIAGNOSIS — Z12.11 ENCOUNTER FOR SCREENING FOR MALIGNANT NEOPLASM OF COLON: ICD-10-CM

## 2025-01-03 ENCOUNTER — APPOINTMENT (OUTPATIENT)
Dept: MRI IMAGING | Facility: CLINIC | Age: 55
End: 2025-01-03
Payer: COMMERCIAL

## 2025-01-03 ENCOUNTER — RESULT REVIEW (OUTPATIENT)
Age: 55
End: 2025-01-03

## 2025-01-03 PROCEDURE — 70553 MRI BRAIN STEM W/O & W/DYE: CPT

## 2025-01-03 PROCEDURE — A9585: CPT

## 2025-01-03 PROCEDURE — 70543 MRI ORBT/FAC/NCK W/O &W/DYE: CPT

## 2025-01-10 ENCOUNTER — NON-APPOINTMENT (OUTPATIENT)
Age: 55
End: 2025-01-10

## 2025-01-13 ENCOUNTER — APPOINTMENT (OUTPATIENT)
Dept: GASTROENTEROLOGY | Facility: HOSPITAL | Age: 55
End: 2025-01-13

## 2025-01-13 ENCOUNTER — TRANSCRIPTION ENCOUNTER (OUTPATIENT)
Age: 55
End: 2025-01-13

## 2025-01-13 ENCOUNTER — OUTPATIENT (OUTPATIENT)
Dept: OUTPATIENT SERVICES | Facility: HOSPITAL | Age: 55
LOS: 1 days | End: 2025-01-13
Payer: COMMERCIAL

## 2025-01-13 DIAGNOSIS — Z98.890 OTHER SPECIFIED POSTPROCEDURAL STATES: Chronic | ICD-10-CM

## 2025-01-13 DIAGNOSIS — Z12.12 ENCOUNTER FOR SCREENING FOR MALIGNANT NEOPLASM OF RECTUM: ICD-10-CM

## 2025-01-13 DIAGNOSIS — Z12.11 ENCOUNTER FOR SCREENING FOR MALIGNANT NEOPLASM OF COLON: ICD-10-CM

## 2025-01-13 DIAGNOSIS — Z90.49 ACQUIRED ABSENCE OF OTHER SPECIFIED PARTS OF DIGESTIVE TRACT: Chronic | ICD-10-CM

## 2025-01-13 PROCEDURE — G0121: CPT

## 2025-01-13 PROCEDURE — 45378 DIAGNOSTIC COLONOSCOPY: CPT

## 2025-01-13 DEVICE — CLIP RESOLUTION 360 235CM: Type: IMPLANTABLE DEVICE | Status: FUNCTIONAL

## 2025-01-13 DEVICE — HEMOSPRAY HEMOSTAT ENDO 7F: Type: IMPLANTABLE DEVICE | Status: FUNCTIONAL

## 2025-01-31 ENCOUNTER — APPOINTMENT (OUTPATIENT)
Dept: RADIOLOGY | Facility: CLINIC | Age: 55
End: 2025-01-31

## 2025-01-31 ENCOUNTER — APPOINTMENT (OUTPATIENT)
Dept: MAMMOGRAPHY | Facility: CLINIC | Age: 55
End: 2025-01-31
Payer: COMMERCIAL

## 2025-01-31 ENCOUNTER — APPOINTMENT (OUTPATIENT)
Dept: ULTRASOUND IMAGING | Facility: CLINIC | Age: 55
End: 2025-01-31
Payer: COMMERCIAL

## 2025-01-31 PROCEDURE — 76641 ULTRASOUND BREAST COMPLETE: CPT | Mod: 50

## 2025-01-31 PROCEDURE — 77063 BREAST TOMOSYNTHESIS BI: CPT

## 2025-01-31 PROCEDURE — 77080 DXA BONE DENSITY AXIAL: CPT

## 2025-01-31 PROCEDURE — 77067 SCR MAMMO BI INCL CAD: CPT

## 2025-02-09 ENCOUNTER — NON-APPOINTMENT (OUTPATIENT)
Age: 55
End: 2025-02-09

## 2025-02-24 ENCOUNTER — APPOINTMENT (OUTPATIENT)
Dept: DERMATOLOGY | Facility: CLINIC | Age: 55
End: 2025-02-24

## 2025-02-26 ENCOUNTER — APPOINTMENT (OUTPATIENT)
Dept: OPHTHALMOLOGY | Facility: CLINIC | Age: 55
End: 2025-02-26
Payer: COMMERCIAL

## 2025-02-26 ENCOUNTER — NON-APPOINTMENT (OUTPATIENT)
Age: 55
End: 2025-02-26

## 2025-02-26 PROCEDURE — 99204 OFFICE O/P NEW MOD 45 MIN: CPT

## 2025-02-26 PROCEDURE — 92083 EXTENDED VISUAL FIELD XM: CPT

## 2025-02-26 PROCEDURE — 92133 CPTRZD OPH DX IMG PST SGM ON: CPT

## 2025-02-28 ENCOUNTER — APPOINTMENT (OUTPATIENT)
Dept: MRI IMAGING | Facility: CLINIC | Age: 55
End: 2025-02-28

## 2025-02-28 ENCOUNTER — INPATIENT (INPATIENT)
Facility: HOSPITAL | Age: 55
LOS: 3 days | Discharge: HOME | DRG: 125 | End: 2025-03-04
Attending: PSYCHIATRY & NEUROLOGY
Payer: COMMERCIAL

## 2025-02-28 VITALS
SYSTOLIC BLOOD PRESSURE: 110 MMHG | RESPIRATION RATE: 18 BRPM | HEART RATE: 68 BPM | TEMPERATURE: 98 F | DIASTOLIC BLOOD PRESSURE: 73 MMHG | WEIGHT: 136.91 LBS | HEIGHT: 69 IN | OXYGEN SATURATION: 96 %

## 2025-02-28 DIAGNOSIS — Z90.49 ACQUIRED ABSENCE OF OTHER SPECIFIED PARTS OF DIGESTIVE TRACT: Chronic | ICD-10-CM

## 2025-02-28 DIAGNOSIS — H54.7 UNSPECIFIED VISUAL LOSS: ICD-10-CM

## 2025-02-28 DIAGNOSIS — Z98.890 OTHER SPECIFIED POSTPROCEDURAL STATES: Chronic | ICD-10-CM

## 2025-02-28 LAB
ALBUMIN SERPL ELPH-MCNC: 4.4 G/DL — SIGNIFICANT CHANGE UP (ref 3.3–5)
ALP SERPL-CCNC: 75 U/L — SIGNIFICANT CHANGE UP (ref 40–120)
ALT FLD-CCNC: 19 U/L — SIGNIFICANT CHANGE UP (ref 10–45)
ANION GAP SERPL CALC-SCNC: 10 MMOL/L — SIGNIFICANT CHANGE UP (ref 5–17)
APTT BLD: 34 SEC — SIGNIFICANT CHANGE UP (ref 24.5–35.6)
AST SERPL-CCNC: 21 U/L — SIGNIFICANT CHANGE UP (ref 10–40)
BASOPHILS # BLD AUTO: 0.04 K/UL — SIGNIFICANT CHANGE UP (ref 0–0.2)
BASOPHILS NFR BLD AUTO: 0.6 % — SIGNIFICANT CHANGE UP (ref 0–2)
BILIRUB SERPL-MCNC: 0.3 MG/DL — SIGNIFICANT CHANGE UP (ref 0.2–1.2)
BUN SERPL-MCNC: 18 MG/DL — SIGNIFICANT CHANGE UP (ref 7–23)
CALCIUM SERPL-MCNC: 9.9 MG/DL — SIGNIFICANT CHANGE UP (ref 8.4–10.5)
CHLORIDE SERPL-SCNC: 105 MMOL/L — SIGNIFICANT CHANGE UP (ref 96–108)
CO2 SERPL-SCNC: 28 MMOL/L — SIGNIFICANT CHANGE UP (ref 22–31)
CREAT SERPL-MCNC: 0.99 MG/DL — SIGNIFICANT CHANGE UP (ref 0.5–1.3)
EGFR: 67 ML/MIN/1.73M2 — SIGNIFICANT CHANGE UP
EGFR: 67 ML/MIN/1.73M2 — SIGNIFICANT CHANGE UP
EOSINOPHIL # BLD AUTO: 0.22 K/UL — SIGNIFICANT CHANGE UP (ref 0–0.5)
EOSINOPHIL NFR BLD AUTO: 3.1 % — SIGNIFICANT CHANGE UP (ref 0–6)
GLUCOSE SERPL-MCNC: 96 MG/DL — SIGNIFICANT CHANGE UP (ref 70–99)
HCT VFR BLD CALC: 42.6 % — SIGNIFICANT CHANGE UP (ref 34.5–45)
HGB BLD-MCNC: 13.9 G/DL — SIGNIFICANT CHANGE UP (ref 11.5–15.5)
IMM GRANULOCYTES NFR BLD AUTO: 0.1 % — SIGNIFICANT CHANGE UP (ref 0–0.9)
INR BLD: 0.99 RATIO — SIGNIFICANT CHANGE UP (ref 0.85–1.16)
LYMPHOCYTES # BLD AUTO: 2.7 K/UL — SIGNIFICANT CHANGE UP (ref 1–3.3)
LYMPHOCYTES # BLD AUTO: 37.7 % — SIGNIFICANT CHANGE UP (ref 13–44)
MCHC RBC-ENTMCNC: 30.5 PG — SIGNIFICANT CHANGE UP (ref 27–34)
MCHC RBC-ENTMCNC: 32.6 G/DL — SIGNIFICANT CHANGE UP (ref 32–36)
MCV RBC AUTO: 93.4 FL — SIGNIFICANT CHANGE UP (ref 80–100)
MONOCYTES # BLD AUTO: 0.4 K/UL — SIGNIFICANT CHANGE UP (ref 0–0.9)
MONOCYTES NFR BLD AUTO: 5.6 % — SIGNIFICANT CHANGE UP (ref 2–14)
NEUTROPHILS # BLD AUTO: 3.8 K/UL — SIGNIFICANT CHANGE UP (ref 1.8–7.4)
NEUTROPHILS NFR BLD AUTO: 52.9 % — SIGNIFICANT CHANGE UP (ref 43–77)
NRBC BLD AUTO-RTO: 0 /100 WBCS — SIGNIFICANT CHANGE UP (ref 0–0)
PLATELET # BLD AUTO: 237 K/UL — SIGNIFICANT CHANGE UP (ref 150–400)
POTASSIUM SERPL-MCNC: 4.2 MMOL/L — SIGNIFICANT CHANGE UP (ref 3.5–5.3)
POTASSIUM SERPL-SCNC: 4.2 MMOL/L — SIGNIFICANT CHANGE UP (ref 3.5–5.3)
PROT SERPL-MCNC: 7.3 G/DL — SIGNIFICANT CHANGE UP (ref 6–8.3)
PROTHROM AB SERPL-ACNC: 11.3 SEC — SIGNIFICANT CHANGE UP (ref 9.9–13.4)
RBC # BLD: 4.56 M/UL — SIGNIFICANT CHANGE UP (ref 3.8–5.2)
RBC # FLD: 12.2 % — SIGNIFICANT CHANGE UP (ref 10.3–14.5)
SODIUM SERPL-SCNC: 143 MMOL/L — SIGNIFICANT CHANGE UP (ref 135–145)
WBC # BLD: 7.17 K/UL — SIGNIFICANT CHANGE UP (ref 3.8–10.5)
WBC # FLD AUTO: 7.17 K/UL — SIGNIFICANT CHANGE UP (ref 3.8–10.5)

## 2025-02-28 PROCEDURE — 99285 EMERGENCY DEPT VISIT HI MDM: CPT

## 2025-02-28 RX ORDER — METHYLPREDNISOLONE ACETATE 80 MG/ML
1000 INJECTION, SUSPENSION INTRA-ARTICULAR; INTRALESIONAL; INTRAMUSCULAR; SOFT TISSUE DAILY
Refills: 0 | Status: COMPLETED | OUTPATIENT
Start: 2025-03-01 | End: 2025-03-02

## 2025-02-28 RX ORDER — ESCITALOPRAM OXALATE 20 MG/1
20 TABLET ORAL ONCE
Refills: 0 | Status: COMPLETED | OUTPATIENT
Start: 2025-02-28 | End: 2025-02-28

## 2025-02-28 RX ORDER — ENOXAPARIN SODIUM 100 MG/ML
40 INJECTION SUBCUTANEOUS EVERY 24 HOURS
Refills: 0 | Status: DISCONTINUED | OUTPATIENT
Start: 2025-02-28 | End: 2025-03-02

## 2025-02-28 RX ORDER — METHYLPREDNISOLONE ACETATE 80 MG/ML
1000 INJECTION, SUSPENSION INTRA-ARTICULAR; INTRALESIONAL; INTRAMUSCULAR; SOFT TISSUE ONCE
Refills: 0 | Status: COMPLETED | OUTPATIENT
Start: 2025-02-28 | End: 2025-02-28

## 2025-02-28 RX ORDER — ESCITALOPRAM OXALATE 20 MG/1
20 TABLET ORAL DAILY
Refills: 0 | Status: DISCONTINUED | OUTPATIENT
Start: 2025-02-28 | End: 2025-03-04

## 2025-02-28 RX ADMIN — METHYLPREDNISOLONE ACETATE 50 MILLIGRAM(S): 80 INJECTION, SUSPENSION INTRA-ARTICULAR; INTRALESIONAL; INTRAMUSCULAR; SOFT TISSUE at 20:52

## 2025-02-28 RX ADMIN — ESCITALOPRAM OXALATE 20 MILLIGRAM(S): 20 TABLET ORAL at 23:11

## 2025-02-28 NOTE — ED PROVIDER NOTE - CLINICAL SUMMARY MEDICAL DECISION MAKING FREE TEXT BOX
Afebrile hemodynamically stable female presents to ED from neuro-ophthalmologist office for concerns of optic neuritis in the left eye.  Ophthalmology already aware and performs eye exam including normal IOP, visual acuity 20/25 OD, 20/20 OS.  Normal EOM, PERRLA.  Cranial and neurologically intact.  No focal deficits.  Ophthalmology requesting neuroconsult and recommend MRI brain/orbits with and without contrast, LP.  Neurology, IV steroids.  Consulted neurology, will see patient.  Will order basic labs including CBC/CMP.  Will reassess.

## 2025-02-28 NOTE — ED PROVIDER NOTE - OBJECTIVE STATEMENT
54-year-old female no significant past medical history presents to ED from neuro-ophthalmologist, Dr. Ruggiero on 2/26/2025 for concerns of optic neuritis in the left eye.  Patient notes that she has been having progressively worsening left eye vision with associated pain x 2 months.  Outpatient MRI imaging revealing optic nerve atrophy on 1/6/2025.  Sent in by neuro-ophthalmologist for IV steroids and neurology consult.  Denies fever, chills, nausea, vomiting, headaches, dizziness, numbness/tingling, focal deficits.  No recent trauma or falls.

## 2025-02-28 NOTE — ED PROVIDER NOTE - ATTENDING CONTRIBUTION TO CARE
Attending MD Larios: I personally have seen and examined this patient.  Resident note reviewed and agree on plan of care except where noted.  See below for details.     Seen in [facility]    54F with no reported contributory PMH/PSH/Meds, no known drug allergies presents to the ED sent in by Dr. Ruggiero (neuro-ophthalmologist) for concerns of optic neuritis.  Reports has been having progressively worsening L eye vision for two months.  Reports had MRI 1/6/25 showing optic nerve atrophy.  Reports was seen by Dr. Ruggiero and was sent for IV steroids and neuro consult.  Denies fevers, chills. Denies numbness, weakness or tingling in extremities. Denies loss of urinary or bowel continence. Denies chest pain, shortness of breath, abdominal pain, nausea, vomiting, diarrhea, urinary complaints. Denies trauma, falls.    Exam:   General: NAD  HENT: head NCAT, airway patent   Eyes: dilated (from office earlier today), EOMI  Chest: symmetric chest rise, no increased work of breathing  MSK: ranging neck freely  Neuro: moving all extremities spontaneously, sensory grossly intact, no gross neuro deficits, CN 2-12 grossly intact  Psych: normal mood and affect     A/P: 54F with concern for optic neuritis, will consult neuro, ophtho, and will obtain imaging and labs and give IV steroids as per both

## 2025-02-28 NOTE — ED ADULT NURSE REASSESSMENT NOTE - NS ED NURSE REASSESS COMMENT FT1
Report taken from RAINA Dai. Patient is A/Ox4, sitting up in bed, respirations even and unlabored. At this time she is not endorsing any pain and is awaiting a bed upstairs.

## 2025-02-28 NOTE — H&P ADULT - NSHPPHYSICALEXAM_GEN_ALL_CORE
Physical Examination: INCOMPLETE  General - NAD, pleasant, cooperative   Cardiovascular - Peripheral pulses palpable, no edema  Neurologic Exam:  Mental status - Awake, Alert, Oriented to person, place, and time. Speech fluent, repetition and naming intact. Follows simple and complex commands. Attention/concentration, recent and remote memory (including registration and recall), and fund of knowledge intact    Cranial nerves:  CN II: Visual fields are full to confrontation. Left APD.  CN III, IV, VI: EOMI, no nystagmus, no ptosis  CN V: Facial sensation is intact to pinprick in all 3 divisions bilaterally.  CN VII: Face is symmetric with normal eye closure and smile.  CN VII: Hearing is normal to rubbing fingers  CN IX, X: Palate elevates symmetrically. Phonation is normal.  CN XI: Head turning and shoulder shrug are intact  CN XII: Tongue is midline with normal movements and no atrophy.    Motor - Normal bulk and tone throughout. No pronator drift of out-stretched arms.  Strength testing            Deltoid(C5)  Biceps(C6)    Triceps(C7)     Wrist Extension    Wrist Flexion (C8)     Interossei  (T1)       R            5                 5                        5                     5                              5                                      5                         5  L             5                 5                        5                     5                              5                                      5                          5              Hip Flexion(L2/3)    Hip Extension (L4/5)   Knee Flexion (L4/5/S1)    Knee Extension (L3/4)   Dorsiflexion (L4/5)   Plantar Flexion (S1)  R              5                                    5                                     5                                                     5                                              5                          5  L              5                                     5                                     5                                                     5                                              5                          5    Sensation - Patchy decreased sensation to light touch in LUE    DTR's -             Biceps      Triceps     Brachioradialis      Patellar    Ankle    Toes/plantar response  R             2+             2+                  2+                       3+            2+                 Down  L              2+             2+                 2+                        3+           2+                 Down    Coordination - Rapid alternating movements and fine finger movements are intact. There is no dysmetria on finger-to-nose and heel-knee-shin. There are no abnormal or extraneous movements.    Gait and station - Posture is normal. Gait is steady with normal steps, base, arm swing, and turning. Heel and toe walking are normal. Tandem gait is normal

## 2025-02-28 NOTE — H&P ADULT - ASSESSMENT
53 yo female with PMH of celiac disease presents after being sent in by Dr. Ruggiero for suspected left optic neuritis. She reports she has had decreased vision in her left eye since around 1 year ago, but was initially mild and did not progress. 2 months ago she noticed her left eye vision gradually worsened. She received an MRI orbits w/wo 1/3/25 which showed possibly L optic nerve atrophy with no enhancement. Also notes since last 2 months she has noticed some trembling in her bilateral thumbs after work, decreased coordination in her hands when using a computer mouse, and myalgia in her bilateral leg muscles. She saw Dr. Ruggiero 2 days ago who found left APD and told her to come to the hospital for IV steroids and MS workup. Denies weakness, numbness, speech changes, dizziness, headache, gait changes.     Impression: Left vision loss with APD and optic nerve atrophy on MRI likely due to acute on chronic optic neuritis.     Recommendations:  [] Start solumedrol 1g QD for 3 days  [] MRI orbits, brain, C and T spine w/wo contrast    Dispo: Admitted  Diet: Gluten free  DVT ppx: Lovenox SQ  Code: full    Discussed with Dr. Auguste. Note and plan not finalized until attending attestation and signature.  55 yo female with PMH of celiac disease presents after being sent in by Dr. Ruggiero for suspected left optic neuritis. She reports she has had decreased vision in her left eye since around 1 year ago, but was initially mild and did not progress. 2 months ago she noticed her left eye vision gradually worsened. She received an MRI orbits w/wo 1/3/25 which showed possibly L optic nerve atrophy with no enhancement. Also notes since last 2 months she has noticed some trembling in her bilateral thumbs after work, decreased coordination in her hands when using a computer mouse, and myalgia in her bilateral leg muscles. She saw Dr. Ruggiero 2 days ago who found left APD and told her to come to the hospital for IV steroids and MS workup. Denies weakness, numbness, speech changes, dizziness, headache, gait changes.     Impression: Left vision loss with APD and optic nerve atrophy on MRI likely due to acute on chronic optic neuritis.     Plan:  [] Start solumedrol 1g QD for 3 days  [] MRI orbits, brain, C and T spine w/wo contrast    Dispo: Admitted  Diet: Gluten free  DVT ppx: Lovenox SQ  Code: full    Discussed with Dr. Auguste. Note and plan not finalized until attending attestation and signature.

## 2025-02-28 NOTE — ED ADULT TRIAGE NOTE - CHIEF COMPLAINT QUOTE
pt sent from neuro opthomology for steroid administration vision changes for last year has worsened was seen Wednesday told she could come to ER on Friday

## 2025-02-28 NOTE — CHART NOTE - NSCHARTNOTEFT_GEN_A_CORE
----------------------------------------------------------------------------------------------------  Yobani Galicia MD PGY-3  Available on teams  ----------------------------------------------------------------------------------------------------    HPI: Seen by Dr. Ruggiero, [facility] neuro-ophthalmologist, on 2/26/25 and sent to ED for further workup due to concern of optic neuritis in the left eye.    Ophthalmology Exam From Clinic:  Visual acuity (sc): 20/25 OD, 20/20 OS  Pupils: 2+ APD OS  Ttono: 14 OD, 13 OS  Extraocular movements (EOMs): Full OU  Confrontational Visual Field (CVF): Full OU    Slit Lamp Exam (SLE)  External: Flat OU  Lids/Lashes/Lacrimal Ducts: Flat OU    Sclera/Conjunctiva: W+Q OU  Cornea: Cl OU  Anterior Chamber: D+F OU    Iris: Flat OU  Lens: Cl OU    Fundus Exam:    Vitreous: wnl OU  Disc, cup/disc: sharp and pink OD, no disc edema, trace temporal disc pallor OS  Macula: ERM OU  Vessels: wnl OU  Periphery: wnl OU    Labs/Imaging:  HVF 24-2: OD: full; OS: central scotoma OS      Assessment/Plan:  - Concern for recurrent episode of left optic neuritis  - Recommend neurology consult  - Recommend MRI brain/orbits w/w/o contrast  - Recommend LP per neurology  - Recommend IV steroids per neurology  - Reach out to ophthalmology on call if any acute changes in vision ----------------------------------------------------------------------------------------------------  Yobani Galicia MD PGY-3  Available on teams  ----------------------------------------------------------------------------------------------------    HPI: Seen by Dr. Ruggiero, [facility] neuro-ophthalmologist, on 2/26/25 and sent to ED for further workup due to concern of optic neuritis in the left eye.    Ophthalmology Exam From Clinic:  Visual acuity (sc): 20/25 OD, 20/20 OS  Pupils: 2+ APD OS  Ttono: 14 OD, 13 OS  Extraocular movements (EOMs): Full OU  Confrontational Visual Field (CVF): Full OU    Slit Lamp Exam (SLE)  External: Flat OU  Lids/Lashes/Lacrimal Ducts: Flat OU    Sclera/Conjunctiva: W+Q OU  Cornea: Cl OU  Anterior Chamber: D+F OU    Iris: Flat OU  Lens: Cl OU    Fundus Exam:    Vitreous: wnl OU  Disc, cup/disc: sharp and pink OD, no disc edema, trace temporal disc pallor OS  Macula: ERM OU  Vessels: wnl OU  Periphery: wnl OU    Labs/Imaging:  HVF 24-2: OD: full; OS: central scotoma OS      Assessment/Plan:  - Concern for recurrent episode of left optic neuritis  - Recommend neurology consult  - Recommend MRI brain/orbits w/w/o contrast  - Recommend LP per neurology  - Ophthalmology will follow while admitted  - Recommend IV steroids per neurology  - Reach out to ophthalmology on call if any acute changes in vision

## 2025-02-28 NOTE — ED ADULT TRIAGE NOTE - NSWEIGHTCALCTOOLDRUG_GEN_A_CORE
Recommended escalating from cefazolin to cefepime for Enterobacter bacteremia. Cefepime should also cover the MSSA bacteremia. Enterobacter susceptibilities pending. Would optimize therapy once susceptibilities return.  used

## 2025-02-28 NOTE — ED ADULT NURSE NOTE - OBJECTIVE STATEMENT
53 y/o female presents to ED from neuro-ophthalmologist recommendation for concern of optic neuritis in L eye. No significant PMH. Pt reports worsening L eye vision/blurry vision for about 2 months. Had outpt MRI showing optic nerve atrophy on 1/6/25. Sent for IV steroids and admission. A&Ox4, breathing unlabored, gross neuro intact, ambulatory independently, skin warm dry and intact.

## 2025-02-28 NOTE — H&P ADULT - ATTENDING COMMENTS
I interviewed the patient, discussed the case with the Resident, and I agree with the findings and plan as documented in the Resident’s note, with the exception of the following:    Ms. Anderson is a 54 year old pleasant right handed woman with PMH of celiac disease, other medical problems who was referred  by Dr. Ruggiero for suspected left optic neuritis.  Patient will benefit from further work up to rule out treatable etiologies. Further workup and management will be guided by pending results.    MRI Brain. C and T spine w/wo contrast  Solumedrol 1 gm for 5 days with insulin sliding scale   Start  MVI and Vit D supplements   Continue medical management, with neuro checks and fall precautions.  GI and DVT prophylaxis.  PT and OT evaluation.    I discussed the diagnosis and treatment plan with the patient, addressing all questions and concerns. The patient  demonstrated a good understanding of the treatment plan.  My cumulative time spent on the care of this patient was 85 minutes.  If you have any further questions, please do not hesitate to contact our consult service.  Thank you for allowing us to participate in this patient’s care.

## 2025-02-28 NOTE — H&P ADULT - HISTORY OF PRESENT ILLNESS
55 yo female with PMH of celiac disease presents after being sent in by Dr. Ruggiero for suspected left optic neuritis. She reports she has had decreased vision in her left eye since around 1 year ago, but was initially mild and did not progress. 2 months ago she noticed her left eye vision gradually worsened. She received an MRI orbits w/wo 1/3/25 which showed possibly L optic nerve atrophy with no enhancement. Also notes since last 2 months she has noticed some trembling in her bilateral thumbs after work, decreased coordination in her hands when using a computer mouse, and myalgia in her bilateral leg muscles. She saw Dr. Ruggiero 2 days ago who found left APD and told her to come to the hospital for IV steroids and MS workup. Denies weakness, numbness, speech changes, dizziness, headache, gait changes.

## 2025-03-01 LAB
A1C WITH ESTIMATED AVERAGE GLUCOSE RESULT: 5.5 % — SIGNIFICANT CHANGE UP (ref 4–5.6)
ADD ON TEST-SPECIMEN IN LAB: SIGNIFICANT CHANGE UP
CRP SERPL-MCNC: <3 MG/L — SIGNIFICANT CHANGE UP (ref 0–4)
ERYTHROCYTE [SEDIMENTATION RATE] IN BLOOD: 14 MM/HR — SIGNIFICANT CHANGE UP (ref 0–20)
ESTIMATED AVERAGE GLUCOSE: 111 MG/DL — SIGNIFICANT CHANGE UP (ref 68–114)
FOLATE SERPL-MCNC: 7.5 NG/ML — SIGNIFICANT CHANGE UP
GLUCOSE BLDC GLUCOMTR-MCNC: 175 MG/DL — HIGH (ref 70–99)
GLUCOSE BLDC GLUCOMTR-MCNC: 209 MG/DL — HIGH (ref 70–99)
HSV 1/2 SOURCE: SIGNIFICANT CHANGE UP
HSV1 DNA BLD QL NAA+PROBE: SIGNIFICANT CHANGE UP
HSV2 DNA BLD QL NAA+PROBE: SIGNIFICANT CHANGE UP
T4 AB SER-ACNC: 7.4 UG/DL — SIGNIFICANT CHANGE UP (ref 4.6–12)
TSH SERPL-MCNC: 0.34 UIU/ML — SIGNIFICANT CHANGE UP (ref 0.27–4.2)
VIT B12 SERPL-MCNC: 485 PG/ML — SIGNIFICANT CHANGE UP (ref 232–1245)

## 2025-03-01 PROCEDURE — 70543 MRI ORBT/FAC/NCK W/O &W/DYE: CPT | Mod: 26

## 2025-03-01 PROCEDURE — 72157 MRI CHEST SPINE W/O & W/DYE: CPT | Mod: 26

## 2025-03-01 PROCEDURE — 70553 MRI BRAIN STEM W/O & W/DYE: CPT | Mod: 26

## 2025-03-01 PROCEDURE — 99223 1ST HOSP IP/OBS HIGH 75: CPT | Mod: GC

## 2025-03-01 PROCEDURE — 72156 MRI NECK SPINE W/O & W/DYE: CPT | Mod: 26

## 2025-03-01 RX ORDER — DEXTROSE 50 % IN WATER 50 %
25 SYRINGE (ML) INTRAVENOUS ONCE
Refills: 0 | Status: DISCONTINUED | OUTPATIENT
Start: 2025-03-01 | End: 2025-03-04

## 2025-03-01 RX ORDER — DEXTROSE 50 % IN WATER 50 %
12.5 SYRINGE (ML) INTRAVENOUS ONCE
Refills: 0 | Status: DISCONTINUED | OUTPATIENT
Start: 2025-03-01 | End: 2025-03-04

## 2025-03-01 RX ORDER — DEXTROSE 50 % IN WATER 50 %
15 SYRINGE (ML) INTRAVENOUS ONCE
Refills: 0 | Status: DISCONTINUED | OUTPATIENT
Start: 2025-03-01 | End: 2025-03-04

## 2025-03-01 RX ORDER — INSULIN LISPRO 100 U/ML
INJECTION, SOLUTION INTRAVENOUS; SUBCUTANEOUS
Refills: 0 | Status: DISCONTINUED | OUTPATIENT
Start: 2025-03-01 | End: 2025-03-04

## 2025-03-01 RX ORDER — SODIUM CHLORIDE 9 G/1000ML
1000 INJECTION, SOLUTION INTRAVENOUS
Refills: 0 | Status: DISCONTINUED | OUTPATIENT
Start: 2025-03-01 | End: 2025-03-04

## 2025-03-01 RX ORDER — GLUCAGON 3 MG/1
1 POWDER NASAL ONCE
Refills: 0 | Status: DISCONTINUED | OUTPATIENT
Start: 2025-03-01 | End: 2025-03-04

## 2025-03-01 RX ADMIN — ENOXAPARIN SODIUM 40 MILLIGRAM(S): 100 INJECTION SUBCUTANEOUS at 11:13

## 2025-03-01 RX ADMIN — Medication 20 MILLIGRAM(S): at 22:18

## 2025-03-01 RX ADMIN — METHYLPREDNISOLONE ACETATE 50 MILLIGRAM(S): 80 INJECTION, SUSPENSION INTRA-ARTICULAR; INTRALESIONAL; INTRAMUSCULAR; SOFT TISSUE at 05:10

## 2025-03-01 RX ADMIN — INSULIN LISPRO 1: 100 INJECTION, SOLUTION INTRAVENOUS; SUBCUTANEOUS at 11:14

## 2025-03-01 RX ADMIN — ESCITALOPRAM OXALATE 20 MILLIGRAM(S): 20 TABLET ORAL at 22:19

## 2025-03-01 RX ADMIN — Medication 40 MILLIGRAM(S): at 11:13

## 2025-03-01 RX ADMIN — INSULIN LISPRO 1: 100 INJECTION, SOLUTION INTRAVENOUS; SUBCUTANEOUS at 16:36

## 2025-03-01 NOTE — PATIENT PROFILE ADULT - FALL HARM RISK - HARM RISK INTERVENTIONS

## 2025-03-01 NOTE — PATIENT PROFILE ADULT - FALL HARM RISK - FALL HARM RISK
Pt was eating well with no changes in appetite. Pt was following Low Na diet at home (at rehab pt on Regular diet with thin liquids). Denies micronutrient or oral nutrient supplement use (ordered for KCl at rehab per transfer papers). Denies Hx of chewing or swallowing issues. Confirms no known food allergies. 
Other

## 2025-03-01 NOTE — PROGRESS NOTE ADULT - ASSESSMENT
54y female with a past medical history/ocular history of left optic neuritis/optic atrophy sent to ED by Dr. Ruggiero for imaging and IV steroids.    #Left optic neuritis  - Today VA 20/20 OU, RAPD OS, EOMs full with slight pain OS, CVF full, color plates full  - Pt had central scotoma OS on outpatient HVF 24-2   - No disc edema seen on outpatient exam  - Pt states her visual sx are improving after starting IV steroids yesterday  - Appreciate neuro recs  - Pending MRI brain/orbits  - LP per neurology  - Continue with IV steroids     DW Dr. Ruggiero    Outpatient Follow-up: Patient should follow-up with his/her ophthalmologist or with [facility] Department of Ophthalmology within 1 week of after discharge at:    [address]  [place], [place] [address] [phone]    Yobani Galicia MD, PGY-3  Also available on [employer] Teams

## 2025-03-01 NOTE — PROGRESS NOTE ADULT - SUBJECTIVE AND OBJECTIVE BOX
[facility] DEPARTMENT OF OPHTHALMOLOGY  ------------------------------------------------------------------------------  Yobani Galicia MD PGY 3  Available on Teams  ------------------------------------------------------------------------------    Interval History: No acute events overnight. Today patient is feeling better after starting IV steroids.    MEDICATIONS  (STANDING):  dextrose 5%. 1000 milliLiter(s) (50 mL/Hr) IV Continuous <Continuous>  dextrose 5%. 1000 milliLiter(s) (100 mL/Hr) IV Continuous <Continuous>  dextrose 50% Injectable 25 Gram(s) IV Push once  dextrose 50% Injectable 12.5 Gram(s) IV Push once  dextrose 50% Injectable 25 Gram(s) IV Push once  enoxaparin Injectable 40 milliGRAM(s) SubCutaneous every 24 hours  escitalopram 20 milliGRAM(s) Oral daily  glucagon  Injectable 1 milliGRAM(s) IntraMuscular once  insulin lispro (ADMELOG) corrective regimen sliding scale   SubCutaneous three times a day before meals  methylPREDNISolone sodium succinate IVPB 1000 milliGRAM(s) IV Intermittent daily  pantoprazole    Tablet 40 milliGRAM(s) Oral before breakfast    MEDICATIONS  (PRN):  dextrose Oral Gel 15 Gram(s) Oral once PRN Blood Glucose LESS THAN 70 milliGRAM(s)/deciliter      VITALS: T(C): 36.8 (03-01-25 @ 09:16)  T(F): 98.2 (03-01-25 @ 09:16), Max: 98.2 (03-01-25 @ 09:16)  HR: 65 (03-01-25 @ 09:16) (51 - 71)  BP: 106/71 (03-01-25 @ 09:16) (100/60 - 132/76)  RR:  (16 - 18)  SpO2:  (95% - 99%)  Wt(kg): --  General: AAO x 3, appropriate mood and affect    Ophthalmology Exam:  Visual acuity (cc): 20/20 OD, 20/20 OS  Pupils: +RAPD OS  Ttono: 16 OD, 16 OS  Extraocular movements (EOMs): Full OU, slight pain OS  Confrontational Visual Field (CVF): Full OU  Color plates: 12/12 OD/OS    Pen Light Exam (PLE)  External: Flat OU  Lids/Lashes/Lacrimal Ducts: Flat OU    Sclera/Conjunctiva: W+Q OU  Cornea: Cl OU  Anterior Chamber: D+F OU    Iris: Flat OU  Lens: Cl OU

## 2025-03-02 LAB
ANION GAP SERPL CALC-SCNC: 14 MMOL/L — SIGNIFICANT CHANGE UP (ref 5–17)
B BURGDOR C6 AB SER-ACNC: NEGATIVE — SIGNIFICANT CHANGE UP
B BURGDOR IGG+IGM SER-ACNC: 0.29 INDEX — SIGNIFICANT CHANGE UP (ref 0.01–0.9)
BUN SERPL-MCNC: 17 MG/DL — SIGNIFICANT CHANGE UP (ref 7–23)
CALCIUM SERPL-MCNC: 9.8 MG/DL — SIGNIFICANT CHANGE UP (ref 8.4–10.5)
CHLORIDE SERPL-SCNC: 105 MMOL/L — SIGNIFICANT CHANGE UP (ref 96–108)
CO2 SERPL-SCNC: 23 MMOL/L — SIGNIFICANT CHANGE UP (ref 22–31)
CREAT SERPL-MCNC: 0.84 MG/DL — SIGNIFICANT CHANGE UP (ref 0.5–1.3)
DSDNA AB SER-ACNC: 1 IU/ML — SIGNIFICANT CHANGE UP
EBV EA AB SER IA-ACNC: 13.9 U/ML — HIGH
EBV EA AB TITR SER IF: POSITIVE
EBV EA IGG SER-ACNC: POSITIVE
EBV NA IGG SER IA-ACNC: 143 U/ML — HIGH
EBV PATRN SPEC IB-IMP: SIGNIFICANT CHANGE UP
EBV VCA IGG AVIDITY SER QL IA: POSITIVE
EBV VCA IGM SER IA-ACNC: 32.3 U/ML — SIGNIFICANT CHANGE UP
EBV VCA IGM SER IA-ACNC: 94.6 U/ML — HIGH
EBV VCA IGM TITR FLD: NEGATIVE — SIGNIFICANT CHANGE UP
EGFR: 82 ML/MIN/1.73M2 — SIGNIFICANT CHANGE UP
EGFR: 82 ML/MIN/1.73M2 — SIGNIFICANT CHANGE UP
GLUCOSE BLDC GLUCOMTR-MCNC: 138 MG/DL — HIGH (ref 70–99)
GLUCOSE BLDC GLUCOMTR-MCNC: 170 MG/DL — HIGH (ref 70–99)
GLUCOSE SERPL-MCNC: 147 MG/DL — HIGH (ref 70–99)
HCT VFR BLD CALC: 40.7 % — SIGNIFICANT CHANGE UP (ref 34.5–45)
HGB BLD-MCNC: 13.3 G/DL — SIGNIFICANT CHANGE UP (ref 11.5–15.5)
HIV 1+2 AB+HIV1 P24 AG SERPL QL IA: SIGNIFICANT CHANGE UP
MAGNESIUM SERPL-MCNC: 2.1 MG/DL — SIGNIFICANT CHANGE UP (ref 1.6–2.6)
MCHC RBC-ENTMCNC: 29.8 PG — SIGNIFICANT CHANGE UP (ref 27–34)
MCHC RBC-ENTMCNC: 32.7 G/DL — SIGNIFICANT CHANGE UP (ref 32–36)
MCV RBC AUTO: 91.1 FL — SIGNIFICANT CHANGE UP (ref 80–100)
NRBC BLD AUTO-RTO: 0 /100 WBCS — SIGNIFICANT CHANGE UP (ref 0–0)
PHOSPHATE SERPL-MCNC: 3.5 MG/DL — SIGNIFICANT CHANGE UP (ref 2.5–4.5)
PLATELET # BLD AUTO: 247 K/UL — SIGNIFICANT CHANGE UP (ref 150–400)
POTASSIUM SERPL-MCNC: 4.2 MMOL/L — SIGNIFICANT CHANGE UP (ref 3.5–5.3)
POTASSIUM SERPL-SCNC: 4.2 MMOL/L — SIGNIFICANT CHANGE UP (ref 3.5–5.3)
RBC # BLD: 4.47 M/UL — SIGNIFICANT CHANGE UP (ref 3.8–5.2)
RBC # FLD: 12.1 % — SIGNIFICANT CHANGE UP (ref 10.3–14.5)
SODIUM SERPL-SCNC: 142 MMOL/L — SIGNIFICANT CHANGE UP (ref 135–145)
T PALLIDUM AB TITR SER: NEGATIVE — SIGNIFICANT CHANGE UP
WBC # BLD: 21.47 K/UL — HIGH (ref 3.8–10.5)
WBC # FLD AUTO: 21.47 K/UL — HIGH (ref 3.8–10.5)

## 2025-03-02 PROCEDURE — 99233 SBSQ HOSP IP/OBS HIGH 50: CPT | Mod: GC

## 2025-03-02 RX ADMIN — INSULIN LISPRO 1: 100 INJECTION, SOLUTION INTRAVENOUS; SUBCUTANEOUS at 08:20

## 2025-03-02 RX ADMIN — ESCITALOPRAM OXALATE 20 MILLIGRAM(S): 20 TABLET ORAL at 21:17

## 2025-03-02 RX ADMIN — Medication 40 MILLIGRAM(S): at 06:59

## 2025-03-02 RX ADMIN — INSULIN LISPRO 1: 100 INJECTION, SOLUTION INTRAVENOUS; SUBCUTANEOUS at 16:25

## 2025-03-02 RX ADMIN — METHYLPREDNISOLONE ACETATE 50 MILLIGRAM(S): 80 INJECTION, SUSPENSION INTRA-ARTICULAR; INTRALESIONAL; INTRAMUSCULAR; SOFT TISSUE at 06:00

## 2025-03-02 NOTE — PROGRESS NOTE ADULT - SUBJECTIVE AND OBJECTIVE BOX
~~~~~~~~~~~~~~~~~~~~~~~~~~~~~~~~~~~~~~~~~~~~~~~~~~  Neurology Service   [facility] General Neurology Floor- [place] [id number]  History:    INCOMPLETE  Interval History:  Continued on steroids (got 2nd dose 3/1)  C/o dyspepsia, recieved pepcid       Review of Systems:    INCOMPLETE    CONSTITUTIONAL: No fevers or chills  EYES AND ENT: No visual changes or no throat pain   NECK: No pain or stiffness  RESPIRATORY: No hemoptysis or shortness of breath  CARDIOVASCULAR: No chest pain or palpitations  GASTROINTESTINAL: No melena or hematochezia  GENITOURINARY: No dysuria or hematuria  NEUROLOGICAL: +As stated in HPI above  SKIN: No itching, burning, rashes, or lesions   All other review of systems is negative unless indicated above.    Medications  Home Medications:  Lexapro 20 mg oral tablet: 1 orally once a day (07 Jun 2023 07:37)    MEDICATIONS  (STANDING):  dextrose 5%. 1000 milliLiter(s) (50 mL/Hr) IV Continuous <Continuous>  dextrose 5%. 1000 milliLiter(s) (100 mL/Hr) IV Continuous <Continuous>  dextrose 50% Injectable 25 Gram(s) IV Push once  dextrose 50% Injectable 12.5 Gram(s) IV Push once  dextrose 50% Injectable 25 Gram(s) IV Push once  enoxaparin Injectable 40 milliGRAM(s) SubCutaneous every 24 hours  escitalopram 20 milliGRAM(s) Oral daily  glucagon  Injectable 1 milliGRAM(s) IntraMuscular once  insulin lispro (ADMELOG) corrective regimen sliding scale   SubCutaneous three times a day before meals  pantoprazole    Tablet 40 milliGRAM(s) Oral before breakfast    MEDICATIONS  (PRN):  dextrose Oral Gel 15 Gram(s) Oral once PRN Blood Glucose LESS THAN 70 milliGRAM(s)/deciliter      Exam:  Vitals:  Vital Signs Last 24 Hrs  T(C): 36.7 (03-02-25 @ 04:44), Max: 36.8 (03-01-25 @ 09:16)  T(F): 98.1 (03-02-25 @ 04:44), Max: 98.3 (03-01-25 @ 12:38)  HR: 82 (03-02-25 @ 04:44) (65 - 82)  BP: 100/71 (03-02-25 @ 04:44) (100/60 - 110/65)  BP(mean): --  RR: 18 (03-02-25 @ 04:44) (18 - 18)  SpO2: 96% (03-02-25 @ 04:44) (94% - 96%)      I&O's Summary    01 Mar 2025 07:01  -  02 Mar 2025 07:00  --------------------------------------------------------  IN: 380 mL / OUT: 0 mL / NET: 380 mL        INCOMPLETE  Physical Exam: Physical Examination: INCOMPLETE  General - NAD, pleasant, cooperative   Cardiovascular - Peripheral pulses palpable, no edema  Neurologic Exam:  Mental status - Awake, Alert, Oriented to person, place, and time. Speech fluent, repetition and naming intact. Follows simple and complex commands. Attention/concentration, recent and remote memory (including registration and recall), and fund of knowledge intact    Cranial nerves:  CN II: Visual fields are full to confrontation. Left APD.  CN III, IV, VI: EOMI, no nystagmus, no ptosis  CN V: Facial sensation is intact to pinprick in all 3 divisions bilaterally.  CN VII: Face is symmetric with normal eye closure and smile.  CN VII: Hearing is normal to rubbing fingers  CN IX, X: Palate elevates symmetrically. Phonation is normal.  CN XI: Head turning and shoulder shrug are intact  CN XII: Tongue is midline with normal movements and no atrophy.    Motor - Normal bulk and tone throughout. No pronator drift of out-stretched arms.  Strength testing            Deltoid(C5)  Biceps(C6)    Triceps(C7)     Wrist Extension    Wrist Flexion (C8)     Interossei  (T1)       R            5                 5                        5                     5                              5                                      5                         5  L             5                 5                        5                     5                              5                                      5                          5              Hip Flexion(L2/3)    Hip Extension (L4/5)   Knee Flexion (L4/5/S1)    Knee Extension (L3/4)   Dorsiflexion (L4/5)   Plantar Flexion (S1)  R              5                                    5                                     5                                                     5                                              5                          5  L              5                                     5                                     5                                                     5                                              5                          5    Sensation - Patchy decreased sensation to light touch in LUE    DTR's -             Biceps      Triceps     Brachioradialis      Patellar    Ankle    Toes/plantar response  R             2+             2+                  2+                       3+            2+                 Down  L              2+             2+                 2+                        3+           2+                 Down    Coordination - Rapid alternating movements and fine finger movements are intact. There is no dysmetria on finger-to-nose and heel-knee-shin. There are no abnormal or extraneous movements.    Gait and station - Posture is normal. Gait is steady with normal steps, base, arm swing, and turning. Heel and toe walking are normal. Tandem gait is normal      Objective Studies  Labs:                          13.3   21.47 )-----------( 247      ( 02 Mar 2025 06:05 )             40.7     03-02    142  |  105  |  17  ----------------------------<  147[H]  4.2   |  23  |  0.84    Ca    9.8      02 Mar 2025 06:05  Phos  3.5     03-02  Mg     2.1     03-02    TPro  7.3  /  Alb  4.4  /  TBili  0.3  /  DBili  x   /  AST  21  /  ALT  19  /  AlkPhos  75  02-28    MRI Brain, C spine, T spine w/wo 3/1/25 1900    FINDINGS:    There is stable thickening of the optic chiasm extending to the proximal   intraconal segment of both optic nerves. There is no associated T2   hyperintensity or enhancement. These findings are unchanged.    The globes, lenses, optic nerves, optic sheaths, orbital fat, extraocular   muscles, optic chiasm, suprasellar cistern, sella, and lacrimal glands   are negative. No evidence of a mass or inflammation. The superior   ophthalmic veins are normal in size and symmetric.    Cerebral volume is within normal limits. No evidence of white matter   disease.    No acute intracranial hemorrhage. No midline shift or herniation. No   acute ischemia. Intracranial flow voids are patent. The cerebellar   tonsils are normally positioned. The dural venous sinuses are patent,   although this examination was not optimized to evaluate the vasculature.    Limited views of the sinuses and mastoids show mild mucosal thickening   without air-fluid levels, likely chronic.    Limited views of the visualized soft tissues of the neck, face, scalp,   skull base, and calvarium are otherwise unremarkable.    IMPRESSION:    1.  Stable thinning of the optic chiasm and both optic nerves.  2.  No evidence of white matter disease in the brain.    FINDINGS:    A transitional lumbosacral vertebral body is noted, numberedL5 to remain   consistent with the cervical spine. Using this numbering nomenclature,   the tip of the conus ends at T12-L1.    There is no abnormal T2 signal or enhancement in the thoracic cord,   without thoracic plaque identified. There is a benign hemangioma in the   L1 vertebra. There is facet hypertrophy with minimal foraminal narrowing   at T9-T10 bilaterally.    The central canal and neural foramina in the thoracic spine are otherwise   negative. Vertebral body height, alignment, marrow, and cord are   otherwise negative.  The paraspinal musculature is normal in signal and   bulk.    IMPRESSION:    1.  Transitional anatomy, numbered as above.  2.  No thoracic plaque identified.      FINDINGS:    Multiple images are degraded by motion, but are nonetheless diagnostic.    There is no abnormal T2 signal or enhancement in the cervical cord. There   is no cervical plaque identified. There is straightening of the normal   cervical lordosis, which could be congenital or related to muscle spasm.   Disc desiccation from C2-C7. Narrowing of the disc spaces at C2-3 and   from C4-C6.    C1-C2: Negative.  C2-C3: Negative.  C3-C4: Mild bilateral foraminal narrowing.  C4-C5: Broad-based disc osteophyte complex eccentric to the left and   uncovertebral and facet osteophyte. Mild-to-moderate left foraminal   stenosis. Mild right foraminal narrowing.  C5-C6: Mild left foraminal narrowing.  C6-C7: Concentric broad-based disc osteophyte complex and uncovertebral   and facet osteophyte. Mild bilateral foraminal narrowing.  C7-T1: Negative.    The central canal and neural foramina in the cervical spine are otherwise   negative. Vertebral body height, alignment, marrow, posterior fossa, cord   are otherwise negative. No prevertebral soft tissue swelling. The   paraspinal musculature is normal in signal and bulk.    IMPRESSION:    1.  No cervical plaque identified.   ~~~~~~~~~~~~~~~~~~~~~~~~~~~~~~~~~~~~~~~~~~~~~~~~~~  Neurology Service   [facility] General Neurology Floor- [place] [id number]  History:  Interval History:  Continued on steroids (got 2nd dose 3/1)  C/o dyspepsia, recieved pepcid   Light stil bothers her  still having discomfort back of eye  Thumbs still feel weak, dificult texting  Feels some improvement in vision - able to read words on her hospital wrist band   Does feel "woozy" when walking described as light headed    Medications  Home Medications:  Lexapro 20 mg oral tablet: 1 orally once a day (07 Jun 2023 07:37)    MEDICATIONS  (STANDING):  dextrose 5%. 1000 milliLiter(s) (50 mL/Hr) IV Continuous <Continuous>  dextrose 5%. 1000 milliLiter(s) (100 mL/Hr) IV Continuous <Continuous>  dextrose 50% Injectable 25 Gram(s) IV Push once  dextrose 50% Injectable 12.5 Gram(s) IV Push once  dextrose 50% Injectable 25 Gram(s) IV Push once  enoxaparin Injectable 40 milliGRAM(s) SubCutaneous every 24 hours  escitalopram 20 milliGRAM(s) Oral daily  glucagon  Injectable 1 milliGRAM(s) IntraMuscular once  insulin lispro (ADMELOG) corrective regimen sliding scale   SubCutaneous three times a day before meals  pantoprazole    Tablet 40 milliGRAM(s) Oral before breakfast    MEDICATIONS  (PRN):  dextrose Oral Gel 15 Gram(s) Oral once PRN Blood Glucose LESS THAN 70 milliGRAM(s)/deciliter      Exam:  Vitals:  Vital Signs Last 24 Hrs  T(C): 36.7 (03-02-25 @ 04:44), Max: 36.8 (03-01-25 @ 09:16)  T(F): 98.1 (03-02-25 @ 04:44), Max: 98.3 (03-01-25 @ 12:38)  HR: 82 (03-02-25 @ 04:44) (65 - 82)  BP: 100/71 (03-02-25 @ 04:44) (100/60 - 110/65)  BP(mean): --  RR: 18 (03-02-25 @ 04:44) (18 - 18)  SpO2: 96% (03-02-25 @ 04:44) (94% - 96%)      I&O's Summary    01 Mar 2025 07:01  -  02 Mar 2025 07:00  --------------------------------------------------------  IN: 380 mL / OUT: 0 mL / NET: 380 mL        : Physical Examination:   General - NAD, pleasant, cooperative   Neurologic Exam:  Mental status - Awake, Alert, Oriented to person, place, and time. Speech fluent, repetition and naming intact. Follows simple and complex commands. Attention/concentration, recent and remote memory (including registration and recall), and fund of knowledge intact  Cranial nerves:  CN II: Visual fields are full to confrontation. Not able to appreciated APD today. PERRL   CN III, IV, VI: EOMI, no nystagmus, no ptosis  CN V: Facial sensation is intact to light touch in all 3 divisions bilaterally.  CN VII: Face is symmetric with normal eye closure and smile.  CN VII: Hearing is normal to rubbing fingers  CN IX, X: Palate elevates symmetrically. Phonation is normal.  CN XI: Head turning and shoulder shrug are intact  CN XII: Tongue is midline with normal movements and no atrophy.    Motor - Normal bulk and tone throughout. No pronator drift of out-stretched arms.  Strength testing            Deltoid(C5)  Biceps(C6)    Triceps(C7)     Wrist Extension    Wrist Flexion (C8)     Interossei  (T1)       R            5                 5                        5                     5                              5                                      5                         5  L             5                 5                        5                     5                              5                                      5                          5              Hip Flexion(L2/3)    Hip Extension (L4/5)   Knee Flexion (L4/5/S1)    Knee Extension (L3/4)   Dorsiflexion (L4/5)   Plantar Flexion (S1)  R              5                                    5                                     5                                                     5                                              5                          5  L              5                                     5                                     5                                                     5                                              5                          5    Sensation - intact throughout     DTR's -             Biceps      Triceps     Brachioradialis      Patellar    Ankle    Toes/plantar response  R             3+             3+                  3+                       3+            2+                 withdraws  L              3+             3+                 3+                        3+           2+                 withdraws     Coordination - Rapid alternating movements and fine finger movements are intact. There is no dysmetria on finger-to-nose and heel-knee-shin. There are no abnormal or extraneous movements.    Gait and station - Posture is normal. Gait is steady with normal steps, base, arm swing, and turning. Heel and toe walking are normal. Tandem gait is normal      Objective Studies  Labs:                          13.3   21.47 )-----------( 247      ( 02 Mar 2025 06:05 )             40.7     03-02    142  |  105  |  17  ----------------------------<  147[H]  4.2   |  23  |  0.84    Ca    9.8      02 Mar 2025 06:05  Phos  3.5     03-02  Mg     2.1     03-02    TPro  7.3  /  Alb  4.4  /  TBili  0.3  /  DBili  x   /  AST  21  /  ALT  19  /  AlkPhos  75  02-28    MRI Brain, C spine, T spine w/wo 3/1/25 1900    FINDINGS:    There is stable thickening of the optic chiasm extending to the proximal   intraconal segment of both optic nerves. There is no associated T2   hyperintensity or enhancement. These findings are unchanged.    The globes, lenses, optic nerves, optic sheaths, orbital fat, extraocular   muscles, optic chiasm, suprasellar cistern, sella, and lacrimal glands   are negative. No evidence of a mass or inflammation. The superior   ophthalmic veins are normal in size and symmetric.    Cerebral volume is within normal limits. No evidence of white matter   disease.    No acute intracranial hemorrhage. No midline shift or herniation. No   acute ischemia. Intracranial flow voids are patent. The cerebellar   tonsils are normally positioned. The dural venous sinuses are patent,   although this examination was not optimized to evaluate the vasculature.    Limited views of the sinuses and mastoids show mild mucosal thickening   without air-fluid levels, likely chronic.    Limited views of the visualized soft tissues of the neck, face, scalp,   skull base, and calvarium are otherwise unremarkable.    IMPRESSION:    1.  Stable thinning of the optic chiasm and both optic nerves.  2.  No evidence of white matter disease in the brain.    FINDINGS:    A transitional lumbosacral vertebral body is noted, numberedL5 to remain   consistent with the cervical spine. Using this numbering nomenclature,   the tip of the conus ends at T12-L1.    There is no abnormal T2 signal or enhancement in the thoracic cord,   without thoracic plaque identified. There is a benign hemangioma in the   L1 vertebra. There is facet hypertrophy with minimal foraminal narrowing   at T9-T10 bilaterally.    The central canal and neural foramina in the thoracic spine are otherwise   negative. Vertebral body height, alignment, marrow, and cord are   otherwise negative.  The paraspinal musculature is normal in signal and   bulk.    IMPRESSION:    1.  Transitional anatomy, numbered as above.  2.  No thoracic plaque identified.      FINDINGS:    Multiple images are degraded by motion, but are nonetheless diagnostic.    There is no abnormal T2 signal or enhancement in the cervical cord. There   is no cervical plaque identified. There is straightening of the normal   cervical lordosis, which could be congenital or related to muscle spasm.   Disc desiccation from C2-C7. Narrowing of the disc spaces at C2-3 and   from C4-C6.    C1-C2: Negative.  C2-C3: Negative.  C3-C4: Mild bilateral foraminal narrowing.  C4-C5: Broad-based disc osteophyte complex eccentric to the left and   uncovertebral and facet osteophyte. Mild-to-moderate left foraminal   stenosis. Mild right foraminal narrowing.  C5-C6: Mild left foraminal narrowing.  C6-C7: Concentric broad-based disc osteophyte complex and uncovertebral   and facet osteophyte. Mild bilateral foraminal narrowing.  C7-T1: Negative.    The central canal and neural foramina in the cervical spine are otherwise   negative. Vertebral body height, alignment, marrow, posterior fossa, cord   are otherwise negative. No prevertebral soft tissue swelling. The   paraspinal musculature is normal in signal and bulk.    IMPRESSION:    1.  No cervical plaque identified.   ~~~~~~~~~~~~~~~~~~~~~~~~~~~~~~~~~~~~~~~~~~~~~~~~~~  Neurology Service   [facility] General Neurology Floor- [place] [id number]  History:  Interval History:  Continued on steroids (got 2nd dose 3/1)  C/o dyspepsia, received pepcid   Light stil bothers her  still having discomfort back of eye  Thumbs still feel weak, dificult texting  Feels some improvement in vision - able to read words on her hospital wrist band   Does feel "woozy" when walking described as light headed  also pain and aches in legs      Medications  Home Medications:  Lexapro 20 mg oral tablet: 1 orally once a day (07 Jun 2023 07:37)    MEDICATIONS  (STANDING):  dextrose 5%. 1000 milliLiter(s) (50 mL/Hr) IV Continuous <Continuous>  dextrose 5%. 1000 milliLiter(s) (100 mL/Hr) IV Continuous <Continuous>  dextrose 50% Injectable 25 Gram(s) IV Push once  dextrose 50% Injectable 12.5 Gram(s) IV Push once  dextrose 50% Injectable 25 Gram(s) IV Push once  enoxaparin Injectable 40 milliGRAM(s) SubCutaneous every 24 hours  escitalopram 20 milliGRAM(s) Oral daily  glucagon  Injectable 1 milliGRAM(s) IntraMuscular once  insulin lispro (ADMELOG) corrective regimen sliding scale   SubCutaneous three times a day before meals  pantoprazole    Tablet 40 milliGRAM(s) Oral before breakfast    MEDICATIONS  (PRN):  dextrose Oral Gel 15 Gram(s) Oral once PRN Blood Glucose LESS THAN 70 milliGRAM(s)/deciliter      Exam:  Vitals:  Vital Signs Last 24 Hrs  T(C): 36.7 (03-02-25 @ 04:44), Max: 36.8 (03-01-25 @ 09:16)  T(F): 98.1 (03-02-25 @ 04:44), Max: 98.3 (03-01-25 @ 12:38)  HR: 82 (03-02-25 @ 04:44) (65 - 82)  BP: 100/71 (03-02-25 @ 04:44) (100/60 - 110/65)  BP(mean): --  RR: 18 (03-02-25 @ 04:44) (18 - 18)  SpO2: 96% (03-02-25 @ 04:44) (94% - 96%)      I&O's Summary    01 Mar 2025 07:01  -  02 Mar 2025 07:00  --------------------------------------------------------  IN: 380 mL / OUT: 0 mL / NET: 380 mL        : Physical Examination:   General - NAD, pleasant, cooperative   Neurologic Exam:  Mental status - Awake, Alert, Oriented to person, place, and time. Speech fluent, repetition and naming intact. Follows simple and complex commands. Attention/concentration, recent and remote memory (including registration and recall), and fund of knowledge intact  Cranial nerves:  CN II: Visual fields are full to confrontation. Not able to appreciated APD today. PERRL   CN III, IV, VI: EOMI, no nystagmus, no ptosis  CN V: Facial sensation is intact to light touch in all 3 divisions bilaterally.  CN VII: Face is symmetric with normal eye closure and smile.  CN VII: Hearing is normal to rubbing fingers  CN IX, X: Palate elevates symmetrically. Phonation is normal.  CN XI: Head turning and shoulder shrug are intact  CN XII: Tongue is midline with normal movements and no atrophy.    Motor - Normal bulk and tone throughout. No pronator drift of out-stretched arms.  Strength testing  5/5 b/l shoudler abduction, elbow flexion, elbow extension, , interossei, hip flex, knee flex, knee ext, dorsiflex, plantarflex  very mild b/l thumb abductor and adductor weakness     Sensation - intact throughout   DTR's -             Biceps      Triceps     Brachioradialis      Patellar    Ankle    Toes/plantar response  R             3+             3+                  3+                       3+            2+                 withdraws  L              3+             3+                 3+                        3+           2+                 withdraws     Coordination - Rapid alternating movements and fine finger movements are intact. There is no dysmetria on finger-to-nose and heel-knee-shin. There are no abnormal or extraneous movements.    Gait and station - Posture is normal. Gait is steady with normal steps, base, arm swing, and turning. Heel and toe walking are normal. Tandem gait is normal      Objective Studies  Labs:                          13.3   21.47 )-----------( 247      ( 02 Mar 2025 06:05 )             40.7     03-02    142  |  105  |  17  ----------------------------<  147[H]  4.2   |  23  |  0.84    Ca    9.8      02 Mar 2025 06:05  Phos  3.5     03-02  Mg     2.1     03-02    TPro  7.3  /  Alb  4.4  /  TBili  0.3  /  DBili  x   /  AST  21  /  ALT  19  /  AlkPhos  75  02-28    MRI Brain, C spine, T spine w/wo 3/1/25 1900    FINDINGS:    There is stable thickening of the optic chiasm extending to the proximal   intraconal segment of both optic nerves. There is no associated T2   hyperintensity or enhancement. These findings are unchanged.    The globes, lenses, optic nerves, optic sheaths, orbital fat, extraocular   muscles, optic chiasm, suprasellar cistern, sella, and lacrimal glands   are negative. No evidence of a mass or inflammation. The superior   ophthalmic veins are normal in size and symmetric.    Cerebral volume is within normal limits. No evidence of white matter   disease.    No acute intracranial hemorrhage. No midline shift or herniation. No   acute ischemia. Intracranial flow voids are patent. The cerebellar   tonsils are normally positioned. The dural venous sinuses are patent,   although this examination was not optimized to evaluate the vasculature.    Limited views of the sinuses and mastoids show mild mucosal thickening   without air-fluid levels, likely chronic.    Limited views of the visualized soft tissues of the neck, face, scalp,   skull base, and calvarium are otherwise unremarkable.    IMPRESSION:    1.  Stable thinning of the optic chiasm and both optic nerves.  2.  No evidence of white matter disease in the brain.    FINDINGS:    A transitional lumbosacral vertebral body is noted, numberedL5 to remain   consistent with the cervical spine. Using this numbering nomenclature,   the tip of the conus ends at T12-L1.    There is no abnormal T2 signal or enhancement in the thoracic cord,   without thoracic plaque identified. There is a benign hemangioma in the   L1 vertebra. There is facet hypertrophy with minimal foraminal narrowing   at T9-T10 bilaterally.    The central canal and neural foramina in the thoracic spine are otherwise   negative. Vertebral body height, alignment, marrow, and cord are   otherwise negative.  The paraspinal musculature is normal in signal and   bulk.    IMPRESSION:    1.  Transitional anatomy, numbered as above.  2.  No thoracic plaque identified.      FINDINGS:    Multiple images are degraded by motion, but are nonetheless diagnostic.    There is no abnormal T2 signal or enhancement in the cervical cord. There   is no cervical plaque identified. There is straightening of the normal   cervical lordosis, which could be congenital or related to muscle spasm.   Disc desiccation from C2-C7. Narrowing of the disc spaces at C2-3 and   from C4-C6.    C1-C2: Negative.  C2-C3: Negative.  C3-C4: Mild bilateral foraminal narrowing.  C4-C5: Broad-based disc osteophyte complex eccentric to the left and   uncovertebral and facet osteophyte. Mild-to-moderate left foraminal   stenosis. Mild right foraminal narrowing.  C5-C6: Mild left foraminal narrowing.  C6-C7: Concentric broad-based disc osteophyte complex and uncovertebral   and facet osteophyte. Mild bilateral foraminal narrowing.  C7-T1: Negative.    The central canal and neural foramina in the cervical spine are otherwise   negative. Vertebral body height, alignment, marrow, posterior fossa, cord   are otherwise negative. No prevertebral soft tissue swelling. The   paraspinal musculature is normal in signal and bulk.    IMPRESSION:    1.  No cervical plaque identified.

## 2025-03-02 NOTE — PROGRESS NOTE ADULT - ATTENDING COMMENTS
I agree with the findings and plan as documented in the Resident’s note, with the exception of the following:    Ms. Anderson is a 54 year old pleasant right handed woman with PMH of celiac disease, other medical problems who was referred  by Dr. Ruggiero for suspected left optic neuritis.  Patient will benefit from further work up to rule out treatable etiologies. Further workup and management will be guided by pending results. Clinically patient is getting better on steroids.     MRI brain shows thinning of the optic chiasm and both optic nerves    Solumedrol 1 gm for 5 days with insulin sliding scale   Possible LP tomorrow   Continue MVI and Vit D supplements   Continue medical management, with neuro checks and fall precautions.  GI and DVT prophylaxis.  PT and OT evaluation.    I discussed the diagnosis and treatment plan with the patient, addressing all questions and concerns. The patient  demonstrated a good understanding of the treatment plan.  My cumulative time spent on the care of this patient was 55 minutes.  If you have any further questions, please do not hesitate to contact our consult service.  Thank you for allowing us to participate in this patient’s care. I agree with the findings and plan as documented in the Resident’s note, with the exception of the following:    Ms. Anderson is a 54 year old pleasant right handed woman with PMH of celiac disease, other medical problems who was referred  by Dr. Ruggiero for suspected left optic neuritis.  Patient will benefit from further work up to rule out treatable etiologies. Further workup and management will be guided by pending results. Clinically patient is getting better on steroids.     MRI brain shows thinning of the optic chiasm and both optic nerves    Solumedrol 1 gm for 5 days with insulin sliding scale   Possible LP tomorrow   Continue MVI and Vit D supplements   Continue medical management, with neuro checks and fall precautions.  GI and DVT prophylaxis.  PT and OT evaluation.  -Outpatient w/u NCS/EMG to rule out CTS.     I discussed the diagnosis and treatment plan with the patient, addressing all questions and concerns. The patient  demonstrated a good understanding of the treatment plan.  My cumulative time spent on the care of this patient was 55 minutes.  If you have any further questions, please do not hesitate to contact our consult service.  Thank you for allowing us to participate in this patient’s care.

## 2025-03-02 NOTE — PROGRESS NOTE ADULT - ASSESSMENT
53 yo female with PMH of celiac disease presents after being sent in by Dr. Ruggiero for suspected left optic neuritis. She reports she has had decreased vision in her left eye since around 1 year ago, but was initially mild and did not progress. 2 months ago she noticed her left eye vision gradually worsened. She received an MRI orbits w/wo 1/3/25 which showed possibly L optic nerve atrophy with no enhancement. Also notes since last 2 months she has noticed some trembling in her bilateral thumbs after work, decreased coordination in her hands when using a computer mouse, and myalgia in her bilateral leg muscles. She saw Dr. Ruggiero 2 days ago who found left APD and told her to come to the hospital for IV steroids and MS workup. Denies weakness, numbness, speech changes, dizziness, headache, gait changes.   -MRI Brain/Orbits/C/T spine w/wo here (3/1/25)    Stable thinning of the optic chiasm and both optic nerves  No evidence of white matter disease in the brain or spinal cord  -B12 485, ESR 14, CRP <3,  lyme negative, HSV 1/2 neg, TSH 0.34, FT4 7.4, Folate 7.5  -EBV Capsid antigen IgG+, Nuc Antigen +, IgM is negative, early antigen pos, VVCA igG EIA 95, EBNA IgG , EBV IgM EIA 32, EBV EA Ab EIA 14  -A1C 5.5    Impression: Left vision loss with APD and b/l optic nerve atrophy on MRI without radiographic evidence of optic neuritis nor Brain/Cord white matter disease.     Plan:  Note incomplete; pt to be seen and assessed; plan tentative     #Vision loss - bilateral optic neuropathy   Chronic x 1 year, progressive. MRI 1/3/26 Left ON atrophy, no enhancement.   Imaging here w/ Stable thinning of the optic chiasm and both optic nerves.  No evidence of white matter disease in the brain or spinal cord  3/1 [facility] exam [facility] 20/[facility] OU, RAPD OS, EOMs full with slight pain OS, CVF full, color plates full  -1g IVMP x 5 days (2/28-3/4)  -PPI and glucose monitoring / ISS while on IVMP  -Follow up serum labs: NMO, MOG, ESR, CRP, BIB, ANCA, dsDNA, ACE, lyme, CMV, HSV, EBV, lyme, WNV, TSH/T4, B1, B12, folate, Vit D 25OH  -Will also check B2, methanol and mercury   -Follow up Dr. Ruggiero, consider outpatient genetic testing     #Leukocytosis  likely steroid induced, afebrile no infectious symptoms   -will monitor     #Mood disorder  -Continue home escitalopram 20mg once daily    #Celiac disease  -Maintain gluten free diet     Dispo: Admitted  Diet: Gluten free  DVT ppx: Enoxaparin SQ  Code: full   53 yo female with PMH of celiac disease presents after being sent in by Dr. Ruggiero for suspected left optic neuritis. She reports she has had decreased vision in her left eye since around 1 year ago, but was initially mild and did not progress. 2 months ago she noticed her left eye vision gradually worsened. She received an MRI orbits w/wo 1/3/25 which showed possibly L optic nerve atrophy with no enhancement. Also notes since last 2 months she has noticed some trembling in her bilateral thumbs after work, decreased coordination in her hands when using a computer mouse, and myalgia in her bilateral leg muscles. She saw Dr. Ruggiero 2 days ago who found left APD and told her to come to the hospital for IV steroids and MS workup. Denies weakness, numbness, speech changes, dizziness, headache, gait changes.   -MRI Brain/Orbits/C/T spine w/wo here (3/1/25)    Stable thinning of the optic chiasm and both optic nerves  No evidence of white matter disease in the brain or spinal cord  -B12 485, ESR 14, CRP <3,  lyme negative, HSV 1/2 neg, TSH 0.34, FT4 7.4, Folate 7.5  -EBV Capsid antigen IgG+, Nuc Antigen +, IgM is negative, early antigen pos, VVCA igG EIA 95, EBNA IgG , EBV IgM EIA 32, EBV EA Ab EIA 14  -A1C 5.5    Impression: Left vision loss with APD and b/l optic nerve atrophy on MRI without radiographic evidence of active optic neuritis nor Brain/Cord white matter disease.     Plan:  Note incomplete; pt to be seen and assessed; plan tentative   NCS/EMG colin    #Vision loss - bilateral optic neuropathy   Chronic x 1 year, progressive. MRI 1/3/26 Left ON atrophy, no enhancement.   Imaging here w/ Stable thinning of the optic chiasm and both optic nerves.  No evidence of white matter disease in the brain or spinal cord  3/1 optho exam [facility] 20/20 OU, RAPD OS, EOMs full with slight pain OS, CVF full, color plates full  3/2: noting improvement on steroids   -1g IVMP x 5 days (2/28-3/4)  -PPI and glucose monitoring / ISS while on IVMP  -Follow up serum labs: NMO, MOG, ESR, CRP, BIB, ANCA, dsDNA, ACE, lyme, CMV, HSV, EBV, lyme, WNV, TSH/T4, B1, B12, folate, Vit D 25OH  -Will also check B2, methanol and mercury   -Follow up Dr. Ruggiero, consider outpatient genetic testing     #Leukocytosis  likely steroid induced, afebrile no infectious symptoms   -will monitor     #light headed when standing  -Check orthostatics    #Mood disorder  -Continue home escitalopram 20mg once daily    #Celiac disease  -Maintain gluten free diet     Dispo: Admitted  Diet: Gluten free  DVT ppx: Enoxaparin SQ  Code: full   55 yo female with PMH of celiac disease presents after being sent in by Dr. Ruggiero for suspected left optic neuritis. She reports she has had decreased vision in her left eye since around 1 year ago, but was initially mild and did not progress. 2 months ago she noticed her left eye vision gradually worsened. She received an MRI orbits w/wo 1/3/25 which showed possibly L optic nerve atrophy with no enhancement. Also notes since last 2 months she has noticed some trembling in her bilateral thumbs after work, decreased coordination in her hands when using a computer mouse, and myalgia in her bilateral leg muscles. She saw Dr. Ruggiero 2 days ago who found left APD and told her to come to the hospital for IV steroids and MS workup. Denies weakness, numbness, speech changes, dizziness, headache, gait changes.   -MRI Brain/Orbits/C/T spine w/wo here (3/1/25)    Stable thinning of the optic chiasm and both optic nerves  No evidence of white matter disease in the brain or spinal cord  -B12 485, ESR 14, CRP <3,  lyme negative, HSV 1/2 neg, TSH 0.34, FT4 7.4, Folate 7.5  -EBV Capsid antigen IgG+, Nuc Antigen +, IgM is negative, early antigen pos, VVCA igG EIA 95, EBNA IgG , EBV IgM EIA 32, EBV EA Ab EIA 14  -A1C 5.5    Impression: Left vision loss with APD and b/l optic nerve atrophy on MRI without radiographic evidence of active optic neuritis nor Brain/Cord white matter disease.   Clinically noting improvement on steroids     Plan:    #Vision loss - bilateral optic neuropathy   Chronic x 1 year, progressive. MRI 1/3/26 Left ON atrophy, no enhancement.   No pain with EOM, some posterior eye discomfort   Imaging here w/ Stable thinning of the optic chiasm and both optic nerves.  No evidence of white matter disease in the brain or spinal cord  3/1 [facility]tho exam [facility] 20[facility] OU, RAPD OS, EOMs full with slight pain OS, CVF full, color plates full  3/2: noting improvement on steroids   -1g IVMP x 5 days (2/28-3/4)  -PPI and glucose monitoring / ISS while on IVMP  -Follow up serum labs: NMO, MOG, ESR, CRP, BIB, ANCA, dsDNA, ACE, lyme, CMV, HSV, EBV, lyme, WNV, TSH/T4, B1, B12, folate, Vit D 25OH -Will also check B2, methanol and mercury   -Follow up Dr. Ruggiero, consider outpatient genetic testing  -Has appointment with neuroimmune specialist Dr. Gibbs on april 7th   -Plan for LP during week, pharmacological dvt ppx held     #Leukocytosis  likely steroid induced, afebrile no infectious symptoms   -will monitor     #light headed when standing  -Check orthostatics    #b/l thumb weakness  -Outpatient w/u NCS/EMG    #Mood disorder  -Continue home escitalopram 20mg once daily    #Celiac disease  -Maintain gluten free diet     #Dyspepsia   -Pepcid prn for dyspepsia     Dispo: Likely home after steroids, PT/OT evals pending   Diet: Gluten free  DVT ppx: Enoxaparin held (last dose 3/1 11am) for LP. Ambulatory, low risk   Code: full

## 2025-03-03 LAB
ACE SERPL-CCNC: 74 U/L — SIGNIFICANT CHANGE UP (ref 14–82)
ANION GAP SERPL CALC-SCNC: 13 MMOL/L — SIGNIFICANT CHANGE UP (ref 5–17)
APTT BLD: 26.3 SEC — SIGNIFICANT CHANGE UP (ref 24.5–35.6)
AUTO DIFF PNL BLD: NEGATIVE — SIGNIFICANT CHANGE UP
BASOPHILS # BLD AUTO: 0.02 K/UL — SIGNIFICANT CHANGE UP (ref 0–0.2)
BASOPHILS NFR BLD AUTO: 0.1 % — SIGNIFICANT CHANGE UP (ref 0–2)
BUN SERPL-MCNC: 20 MG/DL — SIGNIFICANT CHANGE UP (ref 7–23)
C-ANCA SER-ACNC: NEGATIVE — SIGNIFICANT CHANGE UP
CALCIUM SERPL-MCNC: 10 MG/DL — SIGNIFICANT CHANGE UP (ref 8.4–10.5)
CHLORIDE SERPL-SCNC: 104 MMOL/L — SIGNIFICANT CHANGE UP (ref 96–108)
CK SERPL-CCNC: 39 U/L — SIGNIFICANT CHANGE UP (ref 25–170)
CO2 SERPL-SCNC: 24 MMOL/L — SIGNIFICANT CHANGE UP (ref 22–31)
CREAT SERPL-MCNC: 0.75 MG/DL — SIGNIFICANT CHANGE UP (ref 0.5–1.3)
EGFR: 94 ML/MIN/1.73M2 — SIGNIFICANT CHANGE UP
EGFR: 94 ML/MIN/1.73M2 — SIGNIFICANT CHANGE UP
EOSINOPHIL # BLD AUTO: 0 K/UL — SIGNIFICANT CHANGE UP (ref 0–0.5)
EOSINOPHIL NFR BLD AUTO: 0 % — SIGNIFICANT CHANGE UP (ref 0–6)
GLUCOSE BLDC GLUCOMTR-MCNC: 122 MG/DL — HIGH (ref 70–99)
GLUCOSE BLDC GLUCOMTR-MCNC: 129 MG/DL — HIGH (ref 70–99)
GLUCOSE BLDC GLUCOMTR-MCNC: 142 MG/DL — HIGH (ref 70–99)
GLUCOSE SERPL-MCNC: 122 MG/DL — HIGH (ref 70–99)
HCT VFR BLD CALC: 41.2 % — SIGNIFICANT CHANGE UP (ref 34.5–45)
HGB BLD-MCNC: 13.7 G/DL — SIGNIFICANT CHANGE UP (ref 11.5–15.5)
IMM GRANULOCYTES NFR BLD AUTO: 0.8 % — SIGNIFICANT CHANGE UP (ref 0–0.9)
INR BLD: 1.04 RATIO — SIGNIFICANT CHANGE UP (ref 0.85–1.16)
LYMPHOCYTES # BLD AUTO: 0.81 K/UL — LOW (ref 1–3.3)
LYMPHOCYTES # BLD AUTO: 4 % — LOW (ref 13–44)
MAGNESIUM SERPL-MCNC: 2.2 MG/DL — SIGNIFICANT CHANGE UP (ref 1.6–2.6)
MCHC RBC-ENTMCNC: 30.6 PG — SIGNIFICANT CHANGE UP (ref 27–34)
MCHC RBC-ENTMCNC: 33.3 G/DL — SIGNIFICANT CHANGE UP (ref 32–36)
MCV RBC AUTO: 92 FL — SIGNIFICANT CHANGE UP (ref 80–100)
MONOCYTES # BLD AUTO: 0.72 K/UL — SIGNIFICANT CHANGE UP (ref 0–0.9)
MONOCYTES NFR BLD AUTO: 3.6 % — SIGNIFICANT CHANGE UP (ref 2–14)
NEUTROPHILS # BLD AUTO: 18.51 K/UL — HIGH (ref 1.8–7.4)
NEUTROPHILS NFR BLD AUTO: 91.5 % — HIGH (ref 43–77)
NRBC BLD AUTO-RTO: 0 /100 WBCS — SIGNIFICANT CHANGE UP (ref 0–0)
P-ANCA SER-ACNC: NEGATIVE — SIGNIFICANT CHANGE UP
PHOSPHATE SERPL-MCNC: 3.3 MG/DL — SIGNIFICANT CHANGE UP (ref 2.5–4.5)
PLATELET # BLD AUTO: 237 K/UL — SIGNIFICANT CHANGE UP (ref 150–400)
POTASSIUM SERPL-MCNC: 4.1 MMOL/L — SIGNIFICANT CHANGE UP (ref 3.5–5.3)
POTASSIUM SERPL-SCNC: 4.1 MMOL/L — SIGNIFICANT CHANGE UP (ref 3.5–5.3)
PROTHROM AB SERPL-ACNC: 11.9 SEC — SIGNIFICANT CHANGE UP (ref 9.9–13.4)
RBC # BLD: 4.48 M/UL — SIGNIFICANT CHANGE UP (ref 3.8–5.2)
RBC # FLD: 12.5 % — SIGNIFICANT CHANGE UP (ref 10.3–14.5)
SODIUM SERPL-SCNC: 141 MMOL/L — SIGNIFICANT CHANGE UP (ref 135–145)
WBC # BLD: 20.22 K/UL — HIGH (ref 3.8–10.5)
WBC # FLD AUTO: 20.22 K/UL — HIGH (ref 3.8–10.5)

## 2025-03-03 PROCEDURE — 99232 SBSQ HOSP IP/OBS MODERATE 35: CPT

## 2025-03-03 PROCEDURE — 88189 FLOWCYTOMETRY/READ 16 & >: CPT | Mod: 59

## 2025-03-03 RX ORDER — LIDOCAINE HCL/PF 10 MG/ML
20 VIAL (ML) INJECTION ONCE
Refills: 0 | Status: COMPLETED | OUTPATIENT
Start: 2025-03-03 | End: 2025-03-03

## 2025-03-03 RX ORDER — METHYLPREDNISOLONE ACETATE 80 MG/ML
1000 INJECTION, SUSPENSION INTRA-ARTICULAR; INTRALESIONAL; INTRAMUSCULAR; SOFT TISSUE DAILY
Refills: 0 | Status: DISCONTINUED | OUTPATIENT
Start: 2025-03-03 | End: 2025-03-03

## 2025-03-03 RX ORDER — METHYLPREDNISOLONE ACETATE 80 MG/ML
1000 INJECTION, SUSPENSION INTRA-ARTICULAR; INTRALESIONAL; INTRAMUSCULAR; SOFT TISSUE DAILY
Refills: 0 | Status: COMPLETED | OUTPATIENT
Start: 2025-03-03 | End: 2025-03-04

## 2025-03-03 RX ADMIN — Medication 20 MILLILITER(S): at 16:21

## 2025-03-03 RX ADMIN — Medication 1000 UNIT(S): at 12:06

## 2025-03-03 RX ADMIN — Medication 1000 MILLILITER(S): at 12:00

## 2025-03-03 RX ADMIN — Medication 40 MILLIGRAM(S): at 05:37

## 2025-03-03 RX ADMIN — METHYLPREDNISOLONE ACETATE 50 MILLIGRAM(S): 80 INJECTION, SUSPENSION INTRA-ARTICULAR; INTRALESIONAL; INTRAMUSCULAR; SOFT TISSUE at 05:37

## 2025-03-03 RX ADMIN — ESCITALOPRAM OXALATE 20 MILLIGRAM(S): 20 TABLET ORAL at 21:26

## 2025-03-03 NOTE — PROGRESS NOTE ADULT - SUBJECTIVE AND OBJECTIVE BOX
Patient is a 54y old  Female who presents with a chief complaint of   HPI:  53 yo female with PMH of celiac disease presents after being sent in by Dr. Ruggiero for suspected left optic neuritis. She reports she has had decreased vision in her left eye since around 1 year ago, but was initially mild and did not progress. 2 months ago she noticed her left eye vision gradually worsened. She received an MRI orbits w/wo 1/3/25 which showed possibly L optic nerve atrophy with no enhancement. Also notes since last 2 months she has noticed some trembling in her bilateral thumbs after work, decreased coordination in her hands when using a computer mouse, and myalgia in her bilateral leg muscles. She saw Dr. Ruggiero 2 days ago who found left APD and told her to come to the hospital for IV steroids and MS workup. Denies weakness, numbness, speech changes, dizziness, headache, gait changes.  (28 Feb 2025 19:25)      Interval history: Evaluated at bedside with neurology attending. Reports some improvement in vision after steroid therapy.       Vital Signs Last 24 Hrs  T(C): 36.6 (03 Mar 2025 04:40), Max: 36.8 (02 Mar 2025 13:07)  T(F): 97.9 (03 Mar 2025 04:40), Max: 98.3 (02 Mar 2025 16:20)  HR: 62 (03 Mar 2025 04:40) (57 - 71)  BP: 108/65 (03 Mar 2025 04:40) (101/62 - 111/60)  BP(mean): --  RR: 18 (03 Mar 2025 04:40) (18 - 18)  SpO2: 98% (03 Mar 2025 04:40) (96% - 99%)    Parameters below as of 03 Mar 2025 04:40  Patient On (Oxygen Delivery Method): room air          Physical Exam:  Physical Examination:   General - NAD, pleasant, cooperative   Neurologic Exam:  Mental status - Awake, Alert, Oriented to person, place, and time. Speech fluent, repetition and naming intact. Follows simple commands.     Cranial nerves:  CN II: Visual fields are full to confrontation. Not able to appreciated APD today. PERRL   CN III, IV, VI: EOMI, no nystagmus, no ptosis  CN V: Facial sensation is intact to light touch in all 3 divisions bilaterally.  CN VII: Face is symmetric with normal eye closure and smile.  CN VII: Hearing is normal to rubbing fingers  CN IX, X: Palate elevates symmetrically. Phonation is normal.  CN XI: Head turning and shoulder shrug are intact  CN XII: Tongue is midline with normal movements and no atrophy.    Motor - Normal bulk and tone throughout. No pronator drift of out-stretched arms.  Strength testing  5/5 b/l shoudler abduction, elbow flexion, elbow extension, , interossei, hip flex, knee flex, knee ext, dorsiflex, plantarflex  very mild b/l thumb abductor and adductor weakness     Sensation - intact throughout   DTR's -  Coordination - Rapid alternating movements and fine finger movements are intact. There is no dysmetria on finger-to-nose and heel-knee-shin. There are no abnormal or extraneous movements.    Gait and station - Posture is normal. Gait is steady with normal steps, base, arm swing, and turning. Heel and toe walking are normal. Tandem gait is normal            LABS:                        13.7   20.22 )-----------( 237      ( 03 Mar 2025 06:55 )             41.2     03-03    141  |  104  |  20  ----------------------------<  122[H]  4.1   |  24  |  0.75    Ca    10.0      03 Mar 2025 06:51  Phos  3.3     03-03  Mg     2.2     03-03      PT/INR - ( 03 Mar 2025 06:57 )   PT: 11.9 sec;   INR: 1.04 ratio         PTT - ( 03 Mar 2025 06:57 )  PTT:26.3 sec  Urinalysis Basic - ( 03 Mar 2025 06:51 )    Color: x / Appearance: x / SG: x / pH: x  Gluc: 122 mg/dL / Ketone: x  / Bili: x / Urobili: x   Blood: x / Protein: x / Nitrite: x   Leuk Esterase: x / RBC: x / WBC x   Sq Epi: x / Non Sq Epi: x / Bacteria: x        CULTURES:        I&O:       Medications:    RADIOLOGY & ADDITIONAL STUDIES:

## 2025-03-03 NOTE — PROGRESS NOTE ADULT - ATTENDING COMMENTS
seen and examined on rounds  chart and imaging reviewed  54f   1 year hx of mild vision loss OS  recent decline over a few months  MRI with left optic atrophy  sent in for steroids which have helped   brain without any enhancement or parenchymal lesions  spine MRI unrevealing     on exam she has slight left APD  remainder of exam is normal    impression is possible ON versus other underlying optic neuropathy  seems to be responding to steroids    complete 5 days of IVSM (today is 4/5 planned)  LP today for cells, OCB, NMO, MOG  serologies pending for NMO, MOG   dvt ppx held for LP today  outpatient ophtho f/u, possible genetic testing

## 2025-03-03 NOTE — PHYSICAL THERAPY INITIAL EVALUATION ADULT - PERTINENT HX OF CURRENT PROBLEM, REHAB EVAL
53 yo female with PMH of celiac disease presents after being sent in by Dr. Ruggiero for suspected left optic neuritis. She reports she has had decreased vision in her left eye since around 1 year ago, but was initially mild and did not progress. 2 months ago she noticed her left eye vision gradually worsened. She received an MRI orbits w/wo 1/3/25 which showed possibly L optic nerve atrophy with no enhancement. Also notes since last 2 months she has noticed some trembling in her bilateral thumbs after work, decreased coordination in her hands when using a computer mouse, and myalgia in her bilateral leg muscles. She saw Dr. Ruggiero 2 days ago who found left APD and told her to come to the hospital for IV steroids and MS workup. Denies weakness, numbness, speech changes, dizziness, headache, gait changes.  3/1/25: MRI brain/orbits: 1.  Stable thinning of the optic chiasm and both optic nerves. 2.  No evidence of white matter disease in the brain.  MRI cervical spine:  No cervical plaque identified.  MRI thoracic spine:  No thoracic plaque identified.

## 2025-03-03 NOTE — OCCUPATIONAL THERAPY INITIAL EVALUATION ADULT - ADDITIONAL COMMENTS
Discharge Summary - Jamie Mijares 33 y.o. male MRN: [id number]    Unit/Bed#: [facility] Encounter: [id number]      Pre-Operative Diagnosis: Pre-Op Diagnosis Codes:      * Other complications of gastric band procedure [K95.09]     * Vomiting [R11.10]     * Swallowing difficulty [R13.10]     * Weight gain [R63.5]    Post-Operative Diagnosis: Post-Op Diagnosis Codes:     * Other complications of gastric band procedure [K95.09]     * Vomiting [R11.10]     * Swallowing difficulty [R13.10]     * Weight gain [R63.5]    Procedures Performed:  Procedure(s):  REMOVAL GASTRIC BAND W/ ROBOTICS    Surgeon: Michele Varghese MD    See H & P for full details of admission and Operative Note for full details of operations performed.     Patient tolerated surgery well without complications. Patient discharged day of surgery.    Patient was seen and examined prior to discharge.      Provisions for Follow-Up Care:  See After Visit Summary/Discharge Instructions for information related to follow-up care and home orders.      Disposition: Home, in stable condition.     Planned Readmission: No    Discharge Medications:  See After Visit Summary/Discharge Instructions for reconciled discharge medications provided to patient and family.      Post Operative instructions: Reviewed with patient and/or family.    Signature:   Shun Lopez MD  Date: 12/31/2024 Time: 1:59 PM    
Pt lives in  w/ and 2 kids +5STE +1flight up to bedroom and down to basement. PTA pt was indep w ADL and amb, working as an outpatient physical therapist. Owns no AD/DME.

## 2025-03-03 NOTE — PROGRESS NOTE ADULT - ASSESSMENT
53 yo female with PMH of celiac disease presents after being sent in by Dr. Ruggiero for suspected left optic neuritis. She reports she has had decreased vision in her left eye since around 1 year ago, but was initially mild and did not progress. 2 months ago she noticed her left eye vision gradually worsened. She received an MRI orbits w/wo 1/3/25 which showed possibly L optic nerve atrophy with no enhancement. Also notes since last 2 months she has noticed some trembling in her bilateral thumbs after work, decreased coordination in her hands when using a computer mouse, and myalgia in her bilateral leg muscles. She saw Dr. Ruggiero 2 days ago who found left APD and told her to come to the hospital for IV steroids and MS workup. Denies weakness, numbness, speech changes, dizziness, headache, gait changes.   -MRI Brain/Orbits/C/T spine w/wo here (3/1/25)    Stable thinning of the optic chiasm and both optic nerves  No evidence of white matter disease in the brain or spinal cord  -B12 485, ESR 14, CRP <3,  lyme negative, HSV 1/2 neg, TSH 0.34, FT4 7.4, Folate 7.5  -EBV Capsid antigen IgG+, Nuc Antigen +, IgM is negative, early antigen pos, VVCA igG EIA 95, EBNA IgG , EBV IgM EIA 32, EBV EA Ab EIA 14  -A1C 5.5    Impression: Left vision loss with APD and b/l optic nerve atrophy on MRI without radiographic evidence of active optic neuritis nor Brain/Cord white matter disease.   Clinically noting improvement on steroids     Plan:    #Vision loss - Bilateral optic neuropathy   Chronic x 1 year, progressive. MRI 1/3/26 Left ON atrophy, no enhancement.   No pain with EOM, some posterior eye discomfort   Imaging here w/ Stable thinning of the optic chiasm and both optic nerves.  No evidence of white matter disease in the brain or spinal cord  3/1 optho exam [facility] 20/[facility] OU, RAPD OS, EOMs full with slight pain OS, CVF full, color plates full  3/2: noting improvement on steroids   -1g IVMP x 5 days (2/28-3/4)  -Plan for LP today, pharmacological dvt ppx held   -PPI and glucose monitoring / ISS while on IVMP  -Follow up serum labs: NMO, MOG, ESR, CRP, BIB, ANCA, dsDNA, ACE, lyme, CMV, HSV, EBV, lyme, WNV, TSH/T4, B1, B12, folate, Vit D 25OH, B2, methanol and mercury   -Follow up Dr. Ruggiero, consider outpatient genetic testing  -Has appointment with neuroimmune specialist Dr. Gibbs on April 7th       #Leukocytosis  Likely steroid induced, afebrile no infectious symptoms-stable   -will monitor     #Light headed when standing  -Resolved  -Orthostatics without significant drop     #b/l thumb weakness  -Outpatient w/u NCS/EMG    #Mood disorder  -Continue home escitalopram 20mg once daily    #Celiac disease  -Maintain gluten free diet     #Dyspepsia   -Pepcid prn for dyspepsia     Dispo: Likely home after steroids, PT/OT evals pending   Diet: Gluten free  DVT ppx: Enoxaparin held (last dose 3/1 11am) for LP. Ambulatory, low risk   Code: full

## 2025-03-03 NOTE — PHYSICAL THERAPY INITIAL EVALUATION ADULT - ADDITIONAL COMMENTS
Patient lives in private home with spouse and 2 sons, 4-5 steps to enter, 1 flight to bedroom. Patient independent with ADLs/IADLs, owns no DME, +drives, +works.

## 2025-03-04 ENCOUNTER — TRANSCRIPTION ENCOUNTER (OUTPATIENT)
Age: 55
End: 2025-03-04

## 2025-03-04 VITALS
RESPIRATION RATE: 18 BRPM | SYSTOLIC BLOOD PRESSURE: 117 MMHG | TEMPERATURE: 98 F | OXYGEN SATURATION: 98 % | HEART RATE: 72 BPM | DIASTOLIC BLOOD PRESSURE: 71 MMHG

## 2025-03-04 LAB
ALBUMIN SERPL ELPH-MCNC: 3.7 G/DL — SIGNIFICANT CHANGE UP (ref 3.3–5)
ALP SERPL-CCNC: 63 U/L — SIGNIFICANT CHANGE UP (ref 40–120)
ALT FLD-CCNC: 17 U/L — SIGNIFICANT CHANGE UP (ref 10–45)
ANA TITR SER: NEGATIVE — SIGNIFICANT CHANGE UP
ANION GAP SERPL CALC-SCNC: 10 MMOL/L — SIGNIFICANT CHANGE UP (ref 5–17)
APPEARANCE CSF: CLEAR — SIGNIFICANT CHANGE UP
APTT BLD: 25.6 SEC — SIGNIFICANT CHANGE UP (ref 24.5–35.6)
AST SERPL-CCNC: 13 U/L — SIGNIFICANT CHANGE UP (ref 10–40)
BASOPHILS # BLD AUTO: 0.01 K/UL — SIGNIFICANT CHANGE UP (ref 0–0.2)
BASOPHILS NFR BLD AUTO: 0.1 % — SIGNIFICANT CHANGE UP (ref 0–2)
BILIRUB SERPL-MCNC: 0.4 MG/DL — SIGNIFICANT CHANGE UP (ref 0.2–1.2)
BUN SERPL-MCNC: 18 MG/DL — SIGNIFICANT CHANGE UP (ref 7–23)
CALCIUM SERPL-MCNC: 9.6 MG/DL — SIGNIFICANT CHANGE UP (ref 8.4–10.5)
CHLORIDE SERPL-SCNC: 107 MMOL/L — SIGNIFICANT CHANGE UP (ref 96–108)
CO2 SERPL-SCNC: 23 MMOL/L — SIGNIFICANT CHANGE UP (ref 22–31)
COLOR CSF: SIGNIFICANT CHANGE UP
CREAT SERPL-MCNC: 0.68 MG/DL — SIGNIFICANT CHANGE UP (ref 0.5–1.3)
CSF PCR RESULT: SIGNIFICANT CHANGE UP
EGFR: 103 ML/MIN/1.73M2 — SIGNIFICANT CHANGE UP
EGFR: 103 ML/MIN/1.73M2 — SIGNIFICANT CHANGE UP
EOSINOPHIL # BLD AUTO: 0.01 K/UL — SIGNIFICANT CHANGE UP (ref 0–0.5)
EOSINOPHIL NFR BLD AUTO: 0.1 % — SIGNIFICANT CHANGE UP (ref 0–6)
GLUCOSE BLDC GLUCOMTR-MCNC: 122 MG/DL — HIGH (ref 70–99)
GLUCOSE BLDC GLUCOMTR-MCNC: 147 MG/DL — HIGH (ref 70–99)
GLUCOSE CSF-MCNC: 94 MG/DL — HIGH (ref 40–70)
GLUCOSE SERPL-MCNC: 129 MG/DL — HIGH (ref 70–99)
GRAM STN FLD: SIGNIFICANT CHANGE UP
HCT VFR BLD CALC: 38.9 % — SIGNIFICANT CHANGE UP (ref 34.5–45)
HGB BLD-MCNC: 13.1 G/DL — SIGNIFICANT CHANGE UP (ref 11.5–15.5)
IMM GRANULOCYTES NFR BLD AUTO: 0.8 % — SIGNIFICANT CHANGE UP (ref 0–0.9)
INR BLD: 1.1 RATIO — SIGNIFICANT CHANGE UP (ref 0.85–1.16)
LDH CSF L TO P-CCNC: <15 U/L — SIGNIFICANT CHANGE UP
LDH FLD-CCNC: <15 U/L — SIGNIFICANT CHANGE UP
LYMPHOCYTES # BLD AUTO: 0.72 K/UL — LOW (ref 1–3.3)
LYMPHOCYTES # BLD AUTO: 5.4 % — LOW (ref 13–44)
MCHC RBC-ENTMCNC: 31 PG — SIGNIFICANT CHANGE UP (ref 27–34)
MCHC RBC-ENTMCNC: 33.7 G/DL — SIGNIFICANT CHANGE UP (ref 32–36)
MCV RBC AUTO: 92 FL — SIGNIFICANT CHANGE UP (ref 80–100)
MONOCYTES # BLD AUTO: 0.55 K/UL — SIGNIFICANT CHANGE UP (ref 0–0.9)
MONOCYTES NFR BLD AUTO: 4.1 % — SIGNIFICANT CHANGE UP (ref 2–14)
NEUTROPHILS # BLD AUTO: 11.9 K/UL — HIGH (ref 1.8–7.4)
NEUTROPHILS # CSF: SIGNIFICANT CHANGE UP (ref 0–6)
NEUTROPHILS NFR BLD AUTO: 89.5 % — HIGH (ref 43–77)
NRBC BLD AUTO-RTO: 0 /100 WBCS — SIGNIFICANT CHANGE UP (ref 0–0)
NRBC NFR CSF: <1 /UL — SIGNIFICANT CHANGE UP (ref 0–5)
PLATELET # BLD AUTO: 193 K/UL — SIGNIFICANT CHANGE UP (ref 150–400)
POTASSIUM SERPL-MCNC: 4 MMOL/L — SIGNIFICANT CHANGE UP (ref 3.5–5.3)
POTASSIUM SERPL-SCNC: 4 MMOL/L — SIGNIFICANT CHANGE UP (ref 3.5–5.3)
PROT CSF-MCNC: 28 MG/DL — SIGNIFICANT CHANGE UP (ref 15–45)
PROT SERPL-MCNC: 6 G/DL — SIGNIFICANT CHANGE UP (ref 6–8.3)
PROTHROM AB SERPL-ACNC: 12.6 SEC — SIGNIFICANT CHANGE UP (ref 9.9–13.4)
RBC # BLD: 4.23 M/UL — SIGNIFICANT CHANGE UP (ref 3.8–5.2)
RBC # CSF: 0 /UL — SIGNIFICANT CHANGE UP (ref 0–0)
RBC # FLD: 12.5 % — SIGNIFICANT CHANGE UP (ref 10.3–14.5)
SODIUM SERPL-SCNC: 140 MMOL/L — SIGNIFICANT CHANGE UP (ref 135–145)
SPECIMEN SOURCE: SIGNIFICANT CHANGE UP
TUBE TYPE: SIGNIFICANT CHANGE UP
WBC # BLD: 13.29 K/UL — HIGH (ref 3.8–10.5)
WBC # FLD AUTO: 13.29 K/UL — HIGH (ref 3.8–10.5)
WNV IGG TITR FLD: NEGATIVE — SIGNIFICANT CHANGE UP
WNV IGM SPEC QL: NEGATIVE — SIGNIFICANT CHANGE UP

## 2025-03-04 PROCEDURE — 86780 TREPONEMA PALLIDUM: CPT

## 2025-03-04 PROCEDURE — 83615 LACTATE (LD) (LDH) ENZYME: CPT

## 2025-03-04 PROCEDURE — 97161 PT EVAL LOW COMPLEX 20 MIN: CPT

## 2025-03-04 PROCEDURE — 86255 FLUORESCENT ANTIBODY SCREEN: CPT

## 2025-03-04 PROCEDURE — 99239 HOSP IP/OBS DSCHRG MGMT >30: CPT

## 2025-03-04 PROCEDURE — 86664 EPSTEIN-BARR NUCLEAR ANTIGEN: CPT

## 2025-03-04 PROCEDURE — 84157 ASSAY OF PROTEIN OTHER: CPT

## 2025-03-04 PROCEDURE — 80048 BASIC METABOLIC PNL TOTAL CA: CPT

## 2025-03-04 PROCEDURE — 82746 ASSAY OF FOLIC ACID SERUM: CPT

## 2025-03-04 PROCEDURE — 88185 FLOWCYTOMETRY/TC ADD-ON: CPT

## 2025-03-04 PROCEDURE — 83036 HEMOGLOBIN GLYCOSYLATED A1C: CPT

## 2025-03-04 PROCEDURE — 86592 SYPHILIS TEST NON-TREP QUAL: CPT

## 2025-03-04 PROCEDURE — G0480: CPT

## 2025-03-04 PROCEDURE — 86618 LYME DISEASE ANTIBODY: CPT

## 2025-03-04 PROCEDURE — 86038 ANTINUCLEAR ANTIBODIES: CPT

## 2025-03-04 PROCEDURE — 85027 COMPLETE CBC AUTOMATED: CPT

## 2025-03-04 PROCEDURE — 62270 DX LMBR SPI PNXR: CPT

## 2025-03-04 PROCEDURE — 70553 MRI BRAIN STEM W/O & W/DYE: CPT | Mod: MC

## 2025-03-04 PROCEDURE — 80053 COMPREHEN METABOLIC PANEL: CPT

## 2025-03-04 PROCEDURE — 83735 ASSAY OF MAGNESIUM: CPT

## 2025-03-04 PROCEDURE — 72156 MRI NECK SPINE W/O & W/DYE: CPT | Mod: MC

## 2025-03-04 PROCEDURE — 87799 DETECT AGENT NOS DNA QUANT: CPT

## 2025-03-04 PROCEDURE — 86053 AQAPRN-4 ANTB FLO CYTMTRY EA: CPT

## 2025-03-04 PROCEDURE — 82962 GLUCOSE BLOOD TEST: CPT

## 2025-03-04 PROCEDURE — 86036 ANCA SCREEN EACH ANTIBODY: CPT

## 2025-03-04 PROCEDURE — 87483 CNS DNA AMP PROBE TYPE 12-25: CPT

## 2025-03-04 PROCEDURE — 84425 ASSAY OF VITAMIN B-1: CPT

## 2025-03-04 PROCEDURE — 82607 VITAMIN B-12: CPT

## 2025-03-04 PROCEDURE — 85610 PROTHROMBIN TIME: CPT

## 2025-03-04 PROCEDURE — 86665 EPSTEIN-BARR CAPSID VCA: CPT

## 2025-03-04 PROCEDURE — 87389 HIV-1 AG W/HIV-1&-2 AB AG IA: CPT

## 2025-03-04 PROCEDURE — 86341 ISLET CELL ANTIBODY: CPT

## 2025-03-04 PROCEDURE — 36415 COLL VENOUS BLD VENIPUNCTURE: CPT

## 2025-03-04 PROCEDURE — 82550 ASSAY OF CK (CPK): CPT

## 2025-03-04 PROCEDURE — 86362 MOG-IGG1 ANTB CBA EACH: CPT

## 2025-03-04 PROCEDURE — 83825 ASSAY OF MERCURY: CPT

## 2025-03-04 PROCEDURE — 86225 DNA ANTIBODY NATIVE: CPT

## 2025-03-04 PROCEDURE — 82040 ASSAY OF SERUM ALBUMIN: CPT

## 2025-03-04 PROCEDURE — 72157 MRI CHEST SPINE W/O & W/DYE: CPT | Mod: MC

## 2025-03-04 PROCEDURE — 85025 COMPLETE CBC W/AUTO DIFF WBC: CPT

## 2025-03-04 PROCEDURE — 83916 OLIGOCLONAL BANDS: CPT

## 2025-03-04 PROCEDURE — 97165 OT EVAL LOW COMPLEX 30 MIN: CPT

## 2025-03-04 PROCEDURE — 84436 ASSAY OF TOTAL THYROXINE: CPT

## 2025-03-04 PROCEDURE — 85730 THROMBOPLASTIN TIME PARTIAL: CPT

## 2025-03-04 PROCEDURE — 86140 C-REACTIVE PROTEIN: CPT

## 2025-03-04 PROCEDURE — 86663 EPSTEIN-BARR ANTIBODY: CPT

## 2025-03-04 PROCEDURE — 84252 ASSAY OF VITAMIN B-2: CPT

## 2025-03-04 PROCEDURE — 86789 WEST NILE VIRUS ANTIBODY: CPT

## 2025-03-04 PROCEDURE — 85652 RBC SED RATE AUTOMATED: CPT

## 2025-03-04 PROCEDURE — 86788 WEST NILE VIRUS AB IGM: CPT

## 2025-03-04 PROCEDURE — 84443 ASSAY THYROID STIM HORMONE: CPT

## 2025-03-04 PROCEDURE — 87205 SMEAR GRAM STAIN: CPT

## 2025-03-04 PROCEDURE — 99285 EMERGENCY DEPT VISIT HI MDM: CPT

## 2025-03-04 PROCEDURE — A9585: CPT

## 2025-03-04 PROCEDURE — 82306 VITAMIN D 25 HYDROXY: CPT

## 2025-03-04 PROCEDURE — 82042 OTHER SOURCE ALBUMIN QUAN EA: CPT

## 2025-03-04 PROCEDURE — 82945 GLUCOSE OTHER FLUID: CPT

## 2025-03-04 PROCEDURE — 87529 HSV DNA AMP PROBE: CPT

## 2025-03-04 PROCEDURE — 88184 FLOWCYTOMETRY/ TC 1 MARKER: CPT

## 2025-03-04 PROCEDURE — 62328 DX LMBR SPI PNXR W/FLUOR/CT: CPT

## 2025-03-04 PROCEDURE — 83873 ASSAY OF CSF PROTEIN: CPT

## 2025-03-04 PROCEDURE — 89051 BODY FLUID CELL COUNT: CPT

## 2025-03-04 PROCEDURE — 70543 MRI ORBT/FAC/NCK W/O &W/DYE: CPT | Mod: MC

## 2025-03-04 PROCEDURE — 82164 ANGIOTENSIN I ENZYME TEST: CPT

## 2025-03-04 PROCEDURE — 84100 ASSAY OF PHOSPHORUS: CPT

## 2025-03-04 PROCEDURE — 82784 ASSAY IGA/IGD/IGG/IGM EACH: CPT

## 2025-03-04 RX ADMIN — METHYLPREDNISOLONE ACETATE 50 MILLIGRAM(S): 80 INJECTION, SUSPENSION INTRA-ARTICULAR; INTRALESIONAL; INTRAMUSCULAR; SOFT TISSUE at 05:45

## 2025-03-04 RX ADMIN — Medication 1000 UNIT(S): at 11:53

## 2025-03-04 RX ADMIN — Medication 40 MILLIGRAM(S): at 05:45

## 2025-03-04 NOTE — DISCHARGE NOTE NURSING/CASE MANAGEMENT/SOCIAL WORK - NSFLUVACAGEDISCH_IMM_ALL_CORE
Problem: Falls - Risk of:  Goal: Will remain free from falls  Description: Will remain free from falls  Outcome: Ongoing  Goal: Absence of physical injury  Description: Absence of physical injury  Outcome: Ongoing     Problem: Infection:  Goal: Will remain free from infection  Description: Will remain free from infection  Outcome: Ongoing     Problem: Safety:  Goal: Free from accidental physical injury  Description: Free from accidental physical injury  Outcome: Ongoing  Goal: Free from intentional harm  Description: Free from intentional harm  Outcome: Ongoing     Problem: Daily Care:  Goal: Daily care needs are met  Description: Daily care needs are met  Outcome: Ongoing     Problem: Pain:  Goal: Patient's pain/discomfort is manageable  Description: Patient's pain/discomfort is manageable  Outcome: Ongoing     Problem: Skin Integrity:  Goal: Skin integrity will stabilize  Description: Skin integrity will stabilize  Outcome: Ongoing     Problem: Discharge Planning:  Goal: Patients continuum of care needs are met  Description: Patients continuum of care needs are met  Outcome: Ongoing     Problem: Breathing Pattern - Ineffective:  Goal: Ability to achieve and maintain a regular respiratory rate will improve  Description: Ability to achieve and maintain a regular respiratory rate will improve  Outcome: Ongoing     Problem: Airway Clearance - Ineffective  Goal: Achieve or maintain patent airway  Outcome: Ongoing     Problem: Gas Exchange - Impaired  Goal: Absence of hypoxia  Outcome: Ongoing  Goal: Promote optimal lung function  Outcome: Ongoing     Problem: Breathing Pattern - Ineffective  Goal: Ability to achieve and maintain a regular respiratory rate  Outcome: Ongoing     Problem:  Body Temperature -  Risk of, Imbalanced  Goal: Ability to maintain a body temperature within defined limits  Outcome: Ongoing  Goal: Will regain or maintain usual level of consciousness  Outcome: Ongoing  Goal: Complications related to the disease process, condition or treatment will be avoided or minimized  Outcome: Ongoing     Problem: Isolation Precautions - Risk of Spread of Infection  Goal: Prevent transmission of infection  Outcome: Ongoing     Problem: Nutrition Deficits  Goal: Optimize nutritional status  Outcome: Ongoing     Problem: Risk for Fluid Volume Deficit  Goal: Maintain normal heart rhythm  Outcome: Ongoing  Goal: Maintain absence of muscle cramping  Outcome: Ongoing  Goal: Maintain normal serum potassium, sodium, calcium, phosphorus, and pH  Outcome: Ongoing     Problem: Loneliness or Risk for Loneliness  Goal: Demonstrate positive use of time alone when socialization is not possible  Outcome: Ongoing     Problem: Fatigue  Goal: Verbalize increase energy and improved vitality  Outcome: Ongoing     Problem: Patient Education: Go to Patient Education Activity  Goal: Patient/Family Education  Outcome: Ongoing Adult

## 2025-03-04 NOTE — PROGRESS NOTE ADULT - SUBJECTIVE AND OBJECTIVE BOX
Patient is a 54y old  Female who presents with a chief complaint of   HPI:  55 yo female with PMH of celiac disease presents after being sent in by Dr. Ruggiero for suspected left optic neuritis. She reports she has had decreased vision in her left eye since around 1 year ago, but was initially mild and did not progress. 2 months ago she noticed her left eye vision gradually worsened. She received an MRI orbits w/wo 1/3/25 which showed possibly L optic nerve atrophy with no enhancement. Also notes since last 2 months she has noticed some trembling in her bilateral thumbs after work, decreased coordination in her hands when using a computer mouse, and myalgia in her bilateral leg muscles. She saw Dr. Ruggiero 2 days ago who found left APD and told her to come to the hospital for IV steroids and MS workup. Denies weakness, numbness, speech changes, dizziness, headache, gait changes.  (28 Feb 2025 19:25)      Interval history: Patient evaluated at bedside with neurology attending. No acute events overnight, IR guided LP today.      Vital Signs Last 24 Hrs  T(C): 36.7 (04 Mar 2025 05:00), Max: 36.7 (03 Mar 2025 13:20)  T(F): 98.1 (04 Mar 2025 05:00), Max: 98.1 (03 Mar 2025 16:37)  HR: 62 (04 Mar 2025 05:00) (52 - 64)  BP: 107/65 (04 Mar 2025 05:00) (102/64 - 120/73)  BP(mean): --  RR: 18 (04 Mar 2025 05:00) (18 - 18)  SpO2: 98% (04 Mar 2025 05:00) (95% - 100%)    Parameters below as of 04 Mar 2025 05:00  Patient On (Oxygen Delivery Method): room air    Physical Exam:  Physical Examination:   General - NAD, pleasant, cooperative   Neurologic Exam:  Mental status - Awake, Alert, Oriented to person, place, and time. Speech fluent, repetition and naming intact. Follows simple commands.     Cranial nerves:  CN II: Visual fields are full to confrontation. Not able to appreciated APD today. PERRL   CN III, IV, VI: EOMI, no nystagmus, no ptosis  CN V: Facial sensation is intact to light touch in all 3 divisions bilaterally.  CN VII: Face is symmetric with normal eye closure and smile.  CN VII: Hearing is normal to rubbing fingers  CN IX, X: Palate elevates symmetrically. Phonation is normal.  CN XI: Head turning and shoulder shrug are intact  CN XII: Tongue is midline with normal movements and no atrophy.    Motor - Normal bulk and tone throughout. No pronator drift of out-stretched arms.  Strength testing- Spontaneously moving limbs antigravity    Sensation - intact throughout   DTR's -  Coordination - Rapid alternating movements and fine finger movements are intact. There is no dysmetria on finger-to-nose. There are no abnormal or extraneous movements.    Gait and station - Posture is normal. Gait is steady with normal steps, base, arm swing, and turning.   LABS:                        13.1   13.29 )-----------( 193      ( 04 Mar 2025 06:07 )             38.9     03-04    140  |  107  |  18  ----------------------------<  129[H]  4.0   |  23  |  0.68    Ca    9.6      04 Mar 2025 06:06  Phos  3.3     03-03  Mg     2.2     03-03    TPro  6.0  /  Alb  3.7  /  TBili  0.4  /  DBili  x   /  AST  13  /  ALT  17  /  AlkPhos  63  03-04    PT/INR - ( 04 Mar 2025 06:06 )   PT: 12.6 sec;   INR: 1.10 ratio         PTT - ( 04 Mar 2025 06:06 )  PTT:25.6 sec  Urinalysis Basic - ( 04 Mar 2025 06:06 )    Color: x / Appearance: x / SG: x / pH: x  Gluc: 129 mg/dL / Ketone: x  / Bili: x / Urobili: x   Blood: x / Protein: x / Nitrite: x   Leuk Esterase: x / RBC: x / WBC x   Sq Epi: x / Non Sq Epi: x / Bacteria: x        CULTURES:        I&O:       Medications:    RADIOLOGY & ADDITIONAL STUDIES:

## 2025-03-04 NOTE — PROGRESS NOTE ADULT - ATTENDING COMMENTS
seen and examined on rounds  clinically stable  s/p LP  last steroid today    mild left APD   otherwise normal    plan for d/c and f/u with Dr. Gibbs  f/u CSF studies  david clear to me this is demyelinating given the imaging, clinical course  need to consider other etiologies, defer to neuro-ophtho

## 2025-03-04 NOTE — DISCHARGE NOTE PROVIDER - NSDCCPCAREPLAN_GEN_ALL_CORE_FT
PRINCIPAL DISCHARGE DIAGNOSIS  Diagnosis: Vision loss  Assessment and Plan of Treatment: You were found to have left eye vision loss and optic nerve atrophy on MRI . We treated you with a 5 day course of steroids and you showed improvement. Please follow up with neuro-immunologist outpatient for further instructions.

## 2025-03-04 NOTE — PROGRESS NOTE ADULT - ASSESSMENT
53 yo female with PMH of celiac disease presents after being sent in by Dr. Ruggiero for suspected left optic neuritis. She reports she has had decreased vision in her left eye since around 1 year ago, but was initially mild and did not progress. 2 months ago she noticed her left eye vision gradually worsened. She received an MRI orbits w/wo 1/3/25 which showed possibly L optic nerve atrophy with no enhancement. Also notes since last 2 months she has noticed some trembling in her bilateral thumbs after work, decreased coordination in her hands when using a computer mouse, and myalgia in her bilateral leg muscles. She saw Dr. Ruggiero 2 days ago who found left APD and told her to come to the hospital for IV steroids and MS workup. Denies weakness, numbness, speech changes, dizziness, headache, gait changes.   -MRI Brain/Orbits/C/T spine w/wo here (3/1/25)    Stable thinning of the optic chiasm and both optic nerves  No evidence of white matter disease in the brain or spinal cord  -B12 485, ESR 14, CRP <3,  lyme negative, HSV 1/2 neg, TSH 0.34, FT4 7.4, Folate 7.5  -EBV Capsid antigen IgG+, Nuc Antigen +, IgM is negative, early antigen pos, VVCA igG EIA 95, EBNA IgG , EBV IgM EIA 32, EBV EA Ab EIA 14  -A1C 5.5    Impression: Left vision loss with APD and b/l optic nerve atrophy on MRI without radiographic evidence of active optic neuritis nor Brain/Cord white matter disease.   Clinically noting improvement on steroids     Plan:    #Vision loss - Bilateral optic neuropathy   Chronic x 1 year, progressive. MRI 1/3/26 Left ON atrophy, no enhancement.   No pain with EOM, some posterior eye discomfort   Imaging here w/ Stable thinning of the optic chiasm and both optic nerves.  No evidence of white matter disease in the brain or spinal cord  3/1 optho exam [facility] 20/20 OU, RAPD OS, EOMs full with slight pain OS, CVF full, color plates full  3/2: noting improvement on steroids   -1g IVMP x 5 days (2/28-3/4)  -Bedside LP failed attempt, IR guided LP today   -PPI and glucose monitoring / ISS while on IVMP  -Follow up serum labs: NMO, MOG, ESR, CRP, BIB, ANCA, dsDNA, ACE, lyme, CMV, HSV, EBV, lyme, WNV, TSH/T4, B1, B12, folate, Vit D 25OH, B2, methanol and mercury   -Follow up Dr. Ruggiero, consider outpatient genetic testing  -Has appointment with neuroimmune specialist Dr. Gibbs on April 7th       #Leukocytosis  Likely steroid induced, afebrile no infectious symptoms-stable   -will monitor     #Light headed when standing  -Resolved  -Orthostatics without significant drop     #b/l thumb weakness  -Outpatient w/u NCS/EMG    #Mood disorder  -Continue home escitalopram 20mg once daily    #Celiac disease  -Maintain gluten free diet     #Dyspepsia   -Pepcid prn for dyspepsia     Dispo: Likely home after steroids, PT/OT evals pending   Diet: Gluten free  DVT ppx: Enoxaparin held (last dose 3/1 11am) for LP. Ambulatory, low risk   Code: full

## 2025-03-04 NOTE — DISCHARGE NOTE NURSING/CASE MANAGEMENT/SOCIAL WORK - FINANCIAL ASSISTANCE
[facility] provides services at a reduced cost to those who are determined to be eligible through [facility]’s financial assistance program. Information regarding [facility]’s financial assistance program can be found by going to [email or web address] or by calling [phone].

## 2025-03-04 NOTE — DISCHARGE NOTE PROVIDER - NSDCFUSCHEDAPPT_GEN_ALL_CORE_FT
Sarkis Ruggiero  [facility] [place] Physician [facility]  OPHTHALM [address]  Scheduled Appointment: 03/26/2025    Leslie Gibbs  [facility]  NEUROLOGY [address]  Scheduled Appointment: 04/07/2025

## 2025-03-04 NOTE — DISCHARGE NOTE NURSING/CASE MANAGEMENT/SOCIAL WORK - PATIENT PORTAL LINK FT
You can access the FollowMyHealth Patient Portal offered by [facility] by registering at the following website: [email or web address]. By joining [employer]’s FollowMyHealth portal, you will also be able to view your health information using other applications (apps) compatible with our system.

## 2025-03-04 NOTE — PROCEDURE NOTE - NSINFORMCONSENT_GEN_A_CORE
29/Benefits, risks, and possible complications of procedure explained to patient/caregiver who verbalized understanding and gave written consent.
Benefits, risks, and possible complications of procedure explained to patient/caregiver who verbalized understanding and gave written consent.

## 2025-03-04 NOTE — PROCEDURE NOTE - ADDITIONAL PROCEDURE DETAILS
PREPROCEDURE:    Patient presents for fluoroscopically guided lumbar puncture. Received patient on stretcher, alert and oriented x 4. Breathing on room air, spontaneously and unlabored. Risks and benefits were discussed with patient and/or health care proxy.  Risks include but are not limited to headache, bleeding, infection and nerve damage.    Patient and/or health care proxy understands and consents to procedure.    POSTPROCEDURE:    Lumbar puncture was performed at the L2-L3 level using a 20-gauge-spinal needle using fluoroscopic guidance. 20cc of clear spinal fluid was collected and hand delivered to the laboratory. Patient tolerated the procedure well and left the department in stable condition.    Attending: MOOKIE BERNAL MD
Informed consent obtained from patient. Risk and benefit discussed and informed about infectious, bleeding and perforation of internal organ risk. Patient was seated. Area was cleaned in a sterile fashion. 20 G spinal needle was inserted with dry tap when attempted  at L4-L5 attempted. Procedure tolerated well by patient. Minimal blood loss noted. Patient instructed to lie down for an hour. IV fluids started.

## 2025-03-04 NOTE — DISCHARGE NOTE PROVIDER - HOSPITAL COURSE
55 yo female with PMH of celiac disease presents after being sent in by Dr. Ruggiero for suspected left optic neuritis. She reports she has had decreased vision in her left eye since around 1 year ago, but was initially mild and did not progress. 2 months ago she noticed her left eye vision gradually worsened. She received an MRI orbits w/wo 1/3/25 which showed possibly L optic nerve atrophy with no enhancement. Also notes since last 2 months she has noticed some trembling in her bilateral thumbs after work, decreased coordination in her hands when using a computer mouse, and myalgia in her bilateral leg muscles. She saw Dr. Ruggiero 2 days ago who found left APD and told her to come to the hospital for IV steroids and MS workup. Denies weakness, numbness, speech changes, dizziness, headache, gait changes. While here her vision improved following IV steroids, for which she received a 5 day course. She had a lumbar puncture done as well with CSF studies sent to evaluate for various etiologies of optic neuritis. She has been deemed stable for discharge with outpatient followup with Dr. Gibbs, neuro-immunologist.

## 2025-03-04 NOTE — PROCEDURE NOTE - NSSITEPREP_SKIN_A_CORE
chlorhexidine/povidone iodine (if allergic to chlorhexidine)
povidone iodine (if allergic to chlorhexidine)

## 2025-03-05 ENCOUNTER — TRANSCRIPTION ENCOUNTER (OUTPATIENT)
Age: 55
End: 2025-03-05

## 2025-03-05 LAB
METHANOL BLD-MCNC: <.01 G/DL — SIGNIFICANT CHANGE UP (ref 0–0.01)
TM INTERPRETATION: SIGNIFICANT CHANGE UP

## 2025-03-06 LAB
ALBUMIN CSF-MCNC: 16.1 MG/DL — SIGNIFICANT CHANGE UP (ref 14–25)
ALBUMIN SERPL ELPH-MCNC: 4239 MG/DL — SIGNIFICANT CHANGE UP (ref 3500–5200)
AQP4 H2O CHANNEL AB SERPL IA-ACNC: NEGATIVE — SIGNIFICANT CHANGE UP
IGG CSF-MCNC: 1.6 MG/DL — SIGNIFICANT CHANGE UP
IGG FLD-MCNC: 1106 MG/DL — SIGNIFICANT CHANGE UP (ref 610–1660)
IGG SYNTH RATE SER+CSF CALC-MRATE: -5.7 MG/DAY — SIGNIFICANT CHANGE UP
IGG/ALB CLEAR SER+CSF-RTO: 0.4 — SIGNIFICANT CHANGE UP
IGG/ALB CSF: 0.1 RATIO — SIGNIFICANT CHANGE UP
IGG/ALB SER: 0.26 RATIO — SIGNIFICANT CHANGE UP
VIT B1 SERPL-MCNC: 136.8 NMOL/L — SIGNIFICANT CHANGE UP (ref 66.5–200)

## 2025-03-07 LAB
CMV DNA # UR NAA DL=200: SIGNIFICANT CHANGE UP IU/ML
EBV PCR: SIGNIFICANT CHANGE UP IU/ML
INNER EAR 68KD AB FLD QL: <1.5 U/L — SIGNIFICANT CHANGE UP (ref 0–3.1)
JCPYV DNA # CSF NAA+PROBE: SIGNIFICANT CHANGE UP COPIES/ML
MBP CSF-MCNC: 1.9 NG/ML — SIGNIFICANT CHANGE UP (ref 0–3.7)
MERCURY SERPL-MCNC: 8.1 UG/L — SIGNIFICANT CHANGE UP (ref 0–14.9)
VDRL CSF-TITR: SIGNIFICANT CHANGE UP
VIT B2 SERPL-MCNC: 243 UG/L — SIGNIFICANT CHANGE UP (ref 137–370)

## 2025-03-09 LAB — MOG AB CSF QL CBA IFA: SIGNIFICANT CHANGE UP

## 2025-03-10 LAB
AMPA-R AB CBA, CSF: NEGATIVE — SIGNIFICANT CHANGE UP
AMPHIPHYSIN AB TITR CSF: NEGATIVE — SIGNIFICANT CHANGE UP
CASPR2-IGG CBA, CSF: NEGATIVE — SIGNIFICANT CHANGE UP
CV2 IGG TITR CSF: NEGATIVE — SIGNIFICANT CHANGE UP
GABA-B-R AB CBA, CSF: NEGATIVE — SIGNIFICANT CHANGE UP
GAD65 AB CSF-SCNC: 0 NMOL/L — SIGNIFICANT CHANGE UP
GFAP IFA, CSF: NEGATIVE — SIGNIFICANT CHANGE UP
GLIAL NUC TYPE 1 AB TITR CSF: NEGATIVE — SIGNIFICANT CHANGE UP
HU1 AB TITR CSF IF: NEGATIVE — SIGNIFICANT CHANGE UP
HU2 AB TITR CSF IF: NEGATIVE — SIGNIFICANT CHANGE UP
HU3 AB TITR CSF: NEGATIVE — SIGNIFICANT CHANGE UP
IFA NOTES: SIGNIFICANT CHANGE UP
IMMUNOLOGIST REVIEW: SIGNIFICANT CHANGE UP
LGI1-IGG CBA, CSF: NEGATIVE — SIGNIFICANT CHANGE UP
MGLUR1 AB IFA, CSF: NEGATIVE — SIGNIFICANT CHANGE UP
PCA-TR AB TITR CSF: NEGATIVE — SIGNIFICANT CHANGE UP
PURKINJE CELL CYTOPLASMIC AB TYPE 2: NEGATIVE — SIGNIFICANT CHANGE UP
PURKINJE CELLS AB TITR CSF IF: NEGATIVE — SIGNIFICANT CHANGE UP

## 2025-03-11 LAB — MOG AB SER QL CBA IFA: NEGATIVE — SIGNIFICANT CHANGE UP

## 2025-03-12 LAB — OLIGOCLONAL BANDS CSF ELPH-IMP: SIGNIFICANT CHANGE UP

## 2025-03-26 ENCOUNTER — APPOINTMENT (OUTPATIENT)
Dept: OPHTHALMOLOGY | Facility: CLINIC | Age: 55
End: 2025-03-26
Payer: COMMERCIAL

## 2025-03-26 ENCOUNTER — NON-APPOINTMENT (OUTPATIENT)
Age: 55
End: 2025-03-26

## 2025-03-26 PROCEDURE — 92083 EXTENDED VISUAL FIELD XM: CPT

## 2025-03-26 PROCEDURE — 99214 OFFICE O/P EST MOD 30 MIN: CPT

## 2025-03-26 PROCEDURE — 92133 CPTRZD OPH DX IMG PST SGM ON: CPT

## 2025-04-05 ENCOUNTER — NON-APPOINTMENT (OUTPATIENT)
Age: 55
End: 2025-04-05

## 2025-04-07 ENCOUNTER — APPOINTMENT (OUTPATIENT)
Dept: NEUROLOGY | Facility: CLINIC | Age: 55
End: 2025-04-07
Payer: COMMERCIAL

## 2025-04-07 VITALS
WEIGHT: 135 LBS | BODY MASS INDEX: 19.99 KG/M2 | HEART RATE: 57 BPM | HEIGHT: 69 IN | DIASTOLIC BLOOD PRESSURE: 73 MMHG | SYSTOLIC BLOOD PRESSURE: 110 MMHG

## 2025-04-07 DIAGNOSIS — H47.20 UNSPECIFIED OPTIC ATROPHY: ICD-10-CM

## 2025-04-07 PROCEDURE — G2211 COMPLEX E/M VISIT ADD ON: CPT

## 2025-04-07 PROCEDURE — 99215 OFFICE O/P EST HI 40 MIN: CPT

## 2025-04-08 ENCOUNTER — TRANSCRIPTION ENCOUNTER (OUTPATIENT)
Age: 55
End: 2025-04-08

## 2025-04-08 LAB
ESTIMATED AVERAGE GLUCOSE: 111 MG/DL
HBA1C MFR BLD HPLC: 5.5 %
HCYS SERPL-MCNC: 12.3 UMOL/L
VIT B12 SERPL-MCNC: 351 PG/ML

## 2025-04-09 LAB
B2 GLYCOPROT1 IGG SER-ACNC: <1.4 U/ML
B2 GLYCOPROT1 IGM SER-ACNC: 2.7 U/ML
DEPRECATED KAPPA LC FREE/LAMBDA SER: 1.17 RATIO
ENA SS-A AB SER IA-ACNC: <0.2 AL
ENA SS-B AB SER IA-ACNC: <0.2 AL
KAPPA LC CSF-MCNC: 1.04 MG/DL
KAPPA LC SERPL-MCNC: 1.22 MG/DL

## 2025-04-10 LAB
ASIALO-GM1 ANTIBODIES, IGG/IGM: 22 IV
GD1A ANTIBODIES, IGG/IGM: 8 IV
GD1B ANTIBODIES, IGG/IGM: 7 IV
GM1 ANTIBODIES, IGG/IGM: 7 IV
GM2 ANTIBODIES, IGG/IGM: 8 IV
GQ1B ANTIBODIES, IGG/IGM: 6 IV
METHYLMALONATE SERPL-SCNC: 155 NMOL/L

## 2025-04-11 LAB
ALBUMIN MFR SERPL ELPH: 67.1 %
ALBUMIN SERPL-MCNC: 4.4 G/DL
ALBUMIN/GLOB SERPL: 2.1 RATIO
ALPHA1 GLOB MFR SERPL ELPH: 3.5 %
ALPHA1 GLOB SERPL ELPH-MCNC: 0.2 G/DL
ALPHA2 GLOB MFR SERPL ELPH: 7.5 %
ALPHA2 GLOB SERPL ELPH-MCNC: 0.5 G/DL
B-GLOBULIN MFR SERPL ELPH: 9.6 %
B-GLOBULIN SERPL ELPH-MCNC: 0.6 G/DL
COPPER SERPL-MCNC: 90 UG/DL
GAMMA GLOB FLD ELPH-MCNC: 0.8 G/DL
GAMMA GLOB MFR SERPL ELPH: 12.3 %
INTERPRETATION SERPL IEP-IMP: NORMAL
M PROTEIN SPEC IFE-MCNC: NORMAL
PROT SERPL-MCNC: 6.5 G/DL
PROT SERPL-MCNC: 6.5 G/DL
ZINC SERPL-MCNC: 52 UG/DL

## 2025-04-19 LAB — MAG AB SER QL: NEGATIVE

## 2025-04-25 ENCOUNTER — APPOINTMENT (OUTPATIENT)
Dept: NEUROLOGY | Facility: CLINIC | Age: 55
End: 2025-04-25

## 2025-06-03 ENCOUNTER — NON-APPOINTMENT (OUTPATIENT)
Age: 55
End: 2025-06-03

## 2025-06-08 ENCOUNTER — NON-APPOINTMENT (OUTPATIENT)
Age: 55
End: 2025-06-08

## 2025-06-27 ENCOUNTER — APPOINTMENT (OUTPATIENT)
Dept: OPHTHALMOLOGY | Facility: CLINIC | Age: 55
End: 2025-06-27
Payer: COMMERCIAL

## 2025-06-27 ENCOUNTER — NON-APPOINTMENT (OUTPATIENT)
Age: 55
End: 2025-06-27

## 2025-06-27 PROCEDURE — 99214 OFFICE O/P EST MOD 30 MIN: CPT

## 2025-06-27 PROCEDURE — 92083 EXTENDED VISUAL FIELD XM: CPT

## 2025-06-27 PROCEDURE — 92133 CPTRZD OPH DX IMG PST SGM ON: CPT

## (undated) DEVICE — Device

## (undated) DEVICE — SYR LUER SLIP TIP 30CC

## (undated) DEVICE — SUCTION YANKAUER TAPERED BULBOUS NO VENT

## (undated) DEVICE — PACK IV START WITH CHG

## (undated) DEVICE — ENDOCUFF VISION SZ 3 SM PRPL

## (undated) DEVICE — FORCEP RADIAL JAW 4 JUMBO 2.8MM 3.2MM 240CM ORANGE DISP

## (undated) DEVICE — SOL IRR POUR H2O 500ML

## (undated) DEVICE — TUBE O2 SUPL CRUSH RESIS CONN SOUTHSIDE ONLY

## (undated) DEVICE — RETRIEVER ROTH NET PLATINUM-UNIVERSAL

## (undated) DEVICE — FORCEP RADIAL JAW 4 W NDL 2.4MM 2.8MM 240CM ORANGE DISP

## (undated) DEVICE — ELCTR GROUNDING PAD ADULT COVIDIEN

## (undated) DEVICE — FORMALIN CUPS 10% BUFFERED

## (undated) DEVICE — SYR IV POSIFLUSH NS 3ML 30/TY

## (undated) DEVICE — MASK LRG MED AND HIGH O2 CONC M TO M 10FT

## (undated) DEVICE — VALVE BIOPSY

## (undated) DEVICE — CATH IV SAFE BC 20G X 1.16" (PINK)

## (undated) DEVICE — POLY TRAP ETRAP

## (undated) DEVICE — CANISTER SUCTION 1200CC 10/SL

## (undated) DEVICE — TRAP QUICK CATCH  SINGL CHAMBER

## (undated) DEVICE — MASK O2 NON REBREATH 3IN1 ADULT

## (undated) DEVICE — NDL INJ SCLERO INTERJECT 23G

## (undated) DEVICE — TUBING IV SET GRAVITY 3Y 100" MACRO

## (undated) DEVICE — ENDOCUFF VISION SZ 2 LG GRN

## (undated) DEVICE — TUBING IV SET SECONDARY 34"

## (undated) DEVICE — STERIS DEFENDO 3-PIECE KIT (AIR/WATER, SUCTION & BIOPSY VALVES)

## (undated) DEVICE — SNARE POLYP SENS 27MM 240CM

## (undated) DEVICE — CATH IV SAFE BC 22G X 1" (BLUE)

## (undated) DEVICE — TUBING SUCTION CONN 6FT STERILE

## (undated) DEVICE — SENSOR O2 FINGER XL ADULT 24/BX 6BX/CA

## (undated) DEVICE — SNARE CAPTIVATOR II RND COLD 10MM

## (undated) DEVICE — SUT HEWSON RETRIEVER

## (undated) DEVICE — SNARE LRG

## (undated) DEVICE — MARKER ENDO SPOT EX

## (undated) DEVICE — TUBE RECTAL 24FR

## (undated) DEVICE — BRUSH COLONOSCOPY CYTOLOGY

## (undated) DEVICE — SYR LUER SLIP TIP 50CC

## (undated) DEVICE — TUBING CANNULA SALTER LABS NASAL ADULT 7FT